# Patient Record
Sex: FEMALE | Race: WHITE | NOT HISPANIC OR LATINO | Employment: OTHER | URBAN - METROPOLITAN AREA
[De-identification: names, ages, dates, MRNs, and addresses within clinical notes are randomized per-mention and may not be internally consistent; named-entity substitution may affect disease eponyms.]

---

## 2025-04-23 RX ORDER — ATENOLOL 50 MG/1
TABLET ORAL
COMMUNITY
Start: 2024-04-09

## 2025-04-23 RX ORDER — ACETAMINOPHEN 500 MG
TABLET ORAL
COMMUNITY

## 2025-04-23 RX ORDER — CHLORTHALIDONE 25 MG/1
25 TABLET ORAL
COMMUNITY
Start: 2025-04-03

## 2025-04-23 RX ORDER — CELECOXIB 200 MG/1
200 CAPSULE ORAL
COMMUNITY
Start: 2024-05-10 | End: 2025-05-07 | Stop reason: HOSPADM

## 2025-04-23 RX ORDER — VIT A ACET/VIT C/ZINC/PROPOLIS
LOZENGE ORAL
COMMUNITY
End: 2025-05-07 | Stop reason: HOSPADM

## 2025-04-23 RX ORDER — FAMOTIDINE 20 MG/1
20 TABLET, FILM COATED ORAL 2 TIMES DAILY
COMMUNITY
Start: 2025-03-24

## 2025-04-23 RX ORDER — ATORVASTATIN CALCIUM 20 MG/1
TABLET, FILM COATED ORAL
COMMUNITY
Start: 2024-04-09

## 2025-04-28 ENCOUNTER — PREP FOR PROCEDURE (OUTPATIENT)
Dept: ORTHOPEDICS | Facility: HOSPITAL | Age: 75
End: 2025-04-28

## 2025-04-28 ENCOUNTER — PRE-ADMISSION TESTING (OUTPATIENT)
Dept: PREADMISSION TESTING | Facility: HOSPITAL | Age: 75
End: 2025-04-28
Payer: MEDICARE

## 2025-04-28 VITALS
RESPIRATION RATE: 20 BRPM | OXYGEN SATURATION: 98 % | WEIGHT: 234.13 LBS | BODY MASS INDEX: 39.97 KG/M2 | HEIGHT: 64 IN | TEMPERATURE: 96.8 F | HEART RATE: 66 BPM

## 2025-04-28 DIAGNOSIS — R79.1 COAGULATION TEST ABNORMALITY: ICD-10-CM

## 2025-04-28 DIAGNOSIS — Z01.818 PREOP TESTING: Primary | ICD-10-CM

## 2025-04-28 LAB
APTT PPP: 32 SECONDS (ref 26–36)
INR PPP: 1.1 (ref 0.9–1.1)
PROTHROMBIN TIME: 12.4 SECONDS (ref 9.8–12.4)

## 2025-04-28 PROCEDURE — 85610 PROTHROMBIN TIME: CPT

## 2025-04-28 PROCEDURE — 99204 OFFICE O/P NEW MOD 45 MIN: CPT

## 2025-04-28 PROCEDURE — 36415 COLL VENOUS BLD VENIPUNCTURE: CPT

## 2025-04-28 PROCEDURE — 87081 CULTURE SCREEN ONLY: CPT | Mod: PARLAB

## 2025-04-28 RX ORDER — SCOPOLAMINE 1 MG/3D
1 PATCH, EXTENDED RELEASE TRANSDERMAL
Status: CANCELLED | OUTPATIENT
Start: 2025-05-06 | End: 2025-05-09

## 2025-04-28 RX ORDER — CHLORHEXIDINE GLUCONATE ORAL RINSE 1.2 MG/ML
15 SOLUTION DENTAL DAILY
Qty: 30 ML | Refills: 0 | Status: SHIPPED | OUTPATIENT
Start: 2025-04-28 | End: 2025-04-30

## 2025-04-28 RX ORDER — CEFAZOLIN SODIUM 2 G/100ML
2 INJECTION, SOLUTION INTRAVENOUS ONCE
Status: CANCELLED | OUTPATIENT
Start: 2025-05-06 | End: 2025-04-28

## 2025-04-28 RX ORDER — CHLORHEXIDINE GLUCONATE 40 MG/ML
SOLUTION TOPICAL DAILY
Qty: 118 ML | Refills: 0 | Status: SHIPPED | OUTPATIENT
Start: 2025-04-28 | End: 2025-05-03

## 2025-04-28 ASSESSMENT — PAIN - FUNCTIONAL ASSESSMENT: PAIN_FUNCTIONAL_ASSESSMENT: 0-10

## 2025-04-28 ASSESSMENT — DUKE ACTIVITY SCORE INDEX (DASI)
CAN YOU RUN A SHORT DISTANCE: YES
CAN YOU CLIMB A FLIGHT OF STAIRS OR WALK UP A HILL: YES
CAN YOU TAKE CARE OF YOURSELF (EAT, DRESS, BATHE, OR USE TOILET): YES
CAN YOU HAVE SEXUAL RELATIONS: NO
CAN YOU PARTICIPATE IN MODERATE RECREATIONAL ACTIVITIES LIKE GOLF, BOWLING, DANCING, DOUBLES TENNIS OR THROWING A BASEBALL OR FOOTBALL: NO
CAN YOU WALK A BLOCK OR TWO ON LEVEL GROUND: NO
CAN YOU DO YARD WORK LIKE RAKING LEAVES, WEEDING OR PUSHING A MOWER: YES
CAN YOU DO LIGHT WORK AROUND THE HOUSE LIKE DUSTING OR WASHING DISHES: YES
CAN YOU PARTICIPATE IN STRENOUS SPORTS LIKE SWIMMING, SINGLES TENNIS, FOOTBALL, BASKETBALL, OR SKIING: NO
CAN YOU WALK INDOORS, SUCH AS AROUND YOUR HOUSE: YES
CAN YOU DO HEAVY WORK AROUND THE HOUSE LIKE SCRUBBING FLOORS OR LIFTING AND MOVING HEAVY FURNITURE: YES

## 2025-04-28 ASSESSMENT — PAIN SCALES - GENERAL: PAINLEVEL_OUTOF10: 0 - NO PAIN

## 2025-04-28 NOTE — CPM/PAT H&P
CPM/PAT Evaluation       Name: Melita Peck (Melita Peck)  /Age: 1950/74 y.o.     Visit Type:   In-Person       Chief Complaint: Left knee pain requiring intervention    HPI  Patient is a 74 year old female with a history of HTN, HLD, GERD and OA who presents today for preoperative evaluation. Patient follows with Dr. Beckman for arthritis of the left knee. She is scheduled for left total knee replacement under spinal anesthesia on 25 with Dr. Beckman. Patient denies recent illness, fever, chills, fatigue, cough, shortness of breath, chest pain, or GI symptoms.   Endorses slight increase in right knee pain with aggravating activities now that she is holding NSAIDs.   Endorses chronic urinary frequency and urgency that is unchanged. Denies burning or pain with urination.   Endorses chronic lower extremity dependent edema. Denies orthopnea.     Medical History[1]    Surgical History[2]    Patient Sexual activity questions deferred to the physician.    Family History[3]    Allergies[4]    Prior to Admission medications    Medication Sig Start Date End Date Taking? Authorizing Provider   ascorbic acid (Vitamin C) 100 mg tablet Take by mouth.    Historical Provider, MD   atenolol (Tenormin) 50 mg tablet  24   Historical Provider, MD   atorvastatin (Lipitor) 20 mg tablet  24   Historical Provider, MD   celecoxib (CeleBREX) 200 mg capsule Take 1 capsule (200 mg) by mouth. 5/10/24   Historical Provider, MD   chlorthalidone (Hygroton) 25 mg tablet Take 1 tablet (25 mg) by mouth once daily. 4/3/25   Historical Provider, MD   cholecalciferol (Vitamin D-3) 50 mcg (2,000 units) capsule Take by mouth.    Historical Provider, MD   famotidine (Pepcid) 20 mg tablet Take 1 tablet (20 mg) by mouth twice a day. 3/24/25   Historical Provider, MD   vit A-vit C-zinc-propolis (Zinc, with A and C, Lozenges) lozenge Take by mouth.    Historical Provider, MD        A ten-point review of systems was completed and is  "otherwise negative except for what is mentioned in the HPI above.    Physical Exam:    GENERAL: Well developed, obese, awake/alert/oriented x3, no distress, alert and cooperative  HEENT: MMM  NECK: Trachea midline, no lymphadenopathy  CARDIOVASCULAR: RRR, normal S1 and S 2, no murmurs, 2+ equal pulses of the extremities  RESPIRATORY: Patent airways, CTAB, normal breath sounds with good chest expansion, thorax symmetric  ABDOMEN: Soft, non-tender, no distention  SKIN: Warm and dry  EXTREMITIES: No cyanosis. Trace dependent edema to bilateral lower legs.   NEURO: A&O x 3, normal motor and sensation, no focal deficits. Chronic numbness in right leg  PSYCH: Appropriate mood and behavior      PAT AIRWAY:   Airway:     Mallampati::  II    TM distance::  >3 FB    Neck ROM::  Full  normal          Visit Vitals  Pulse 66   Temp 36 °C (96.8 °F) (Temporal)   Resp 20   Ht 1.626 m (5' 4\")   Wt 106 kg (234 lb 2.1 oz)   SpO2 98%   BMI 40.19 kg/m²   Smoking Status Former   BSA 2.19 m²       DASI Risk Score      Flowsheet Row Pre-Admission Testing from 4/28/2025 in [facility]   Can you take care of yourself (eat, dress, bathe, or use toilet)?  2.75 filed at 04/28/2025 1416   Can you walk indoors, such as around your house? 1.75 filed at 04/28/2025 1416   Can you walk a block or two on level ground?  0 filed at 04/28/2025 1416   Can you climb a flight of stairs or walk up a hill? 5.5 filed at 04/28/2025 1416   Can you run a short distance? 8 filed at 04/28/2025 1416   Can you do light work around the house like dusting or washing dishes? 2.7 filed at 04/28/2025 1416   Can you do heavy work around the house like scrubbing floors or lifting and moving heavy furniture?  8 filed at 04/28/2025 1416   Can you do yard work like raking leaves, weeding or pushing a mower? 4.5 filed at 04/28/2025 1416   Can you have sexual relations? 0 filed at 04/28/2025 1416   Can you participate in moderate recreational activities like golf, " bowling, dancing, doubles tennis or throwing a baseball or football? 0 filed at 04/28/2025 1416   Can you participate in strenous sports like swimming, singles tennis, football, basketball, or skiing? 0 filed at 04/28/2025 1416          Caprini DVT Assessment    No data to display       Modified Frailty Index    No data to display       [id number] Stroke Risk Points  Current as of 4 minutes ago        N/A 0 to 9 Points:      Last Change: N/A          The [id number] risk score (Lip TED, et al. 2009. © 2010 [employer]) quantifies the risk of stroke for a patient with atrial fibrillation. For patients without atrial fibrillation or under the age of 18 this score appears as N/A. Higher score values generally indicate higher risk of stroke.        This score is not applicable to this patient. Components are not calculated.          Revised Cardiac Risk Index    No data to display       Apfel Simplified Score    No data to display       Risk Analysis Index Results This Encounter    No data found in the last 10 encounters.       Stop Bang Score      Flowsheet Row Pre-Admission Testing from 4/28/2025 in [facility]   Do you snore loudly? 0 filed at 04/28/2025 1418   Do you often feel tired or fatigued after your sleep? 0 filed at 04/28/2025 1418   Has anyone ever observed you stop breathing in your sleep? 0 filed at 04/28/2025 1418   Do you have or are you being treated for high blood pressure? 1 filed at 04/28/2025 1418   Recent BMI (Calculated) 0 filed at 04/28/2025 1418   Age older than 50 years old? 1=Yes filed at 04/28/2025 1418   Gender - Male 0=No filed at 04/28/2025 1418          Prodigy: High Risk  Total Score: 12              Prodigy Age Score           ARISCAT Score for Postoperative Pulmonary Complications      Flowsheet Row Pre-Admission Testing from 4/28/2025 in [facility]   Age Calculated Score 3 filed at 04/28/2025 1506   Preoperative SpO2 0 filed at  04/28/2025 1506   Respiratory infection in the last month Either upper or lower (i.e., URI, bronchitis, pneumonia), with fever and antibiotic treatment 0 filed at 04/28/2025 1506   Preoperative anemia (Hgb less than 10 g/dl) 0 filed at 04/28/2025 1506   Surgical incision  0 filed at 04/28/2025 1506   Duration of surgery  16 filed at 04/28/2025 1506   Emergency Procedure  0 filed at 04/28/2025 1506   ARISCAT Total Score  19 filed at 04/28/2025 1506          Catherine Perioperative Risk for Myocardial Infarction or Cardiac Arrest (SILKE)      Flowsheet Row Pre-Admission Testing from 4/28/2025 in [facility]   Calculated Age Score 1.52 filed at 04/28/2025 1506   Functional Status  0 filed at 04/28/2025 1506   ASA Class  -3.29 filed at 04/28/2025 1506   Creatinine 0 filed at 04/28/2025 1506   Type of Procedure  0.80 filed at 04/28/2025 1506   SILKE Total Score  -6.22 filed at 04/28/2025 1506   SILKE % 0.2 filed at 04/28/2025 1506            Assessment and Plan:   Patient is a 74 year old female with a history of HTN, HLD, GERD and OA.    Follows with PCP Mónica Erazo CNP - last seen 4/4/25    #Arthritis of the left knee  She is scheduled for left total knee replacement under spinal anesthesia on 5/6/25 with Dr. Beckman.   Preliminarily medically cleared by PCP at visit on 4/4/25 pending diagnostics - formal clearance to be communicated to surgeon    #HTN, Hx  #HLD, Hx  Currently maintained on atenolol, chlorthalidone and atorvastatin  Lipid panel from 4/10/25 shows good control  BP is a little elevated on exam today in the 150s/80s but during routine office visits range is in the 130s/80s    #GERD, Hx  Only while taking NSAIDs or with spicy food - symptoms controled with Pepcid as needed    Labs from 4/10/25 reviewed (cbc, cmp, A1c, lipid panel, magnesium, tsh)    A1c 5.8    CMP and Mg grossly unremarkable    TSH and CBC are normal  Coag and MRSA obtained today and pending  EKG from 2/28/25 demonstrates normal  sinus rhythm with minimal voltage criteria for LVH    I spent 45 minutes in the professional and overall care of this patient. Greater than 50% of this time was spent counseling patient, reviewing plan of care and discussing medication perioperative management.    ZULY Roberson-MARY          [1]   Past Medical History:  Diagnosis Date    Arthritis     HLD (hyperlipidemia)     Hypertension     Joint pain     PONV (postoperative nausea and vomiting)     Vision loss    [2]   Past Surgical History:  Procedure Laterality Date    BLADDER SUSPENSION      COLONOSCOPY      HYSTERECTOMY      TONSILLECTOMY     [3]   Family History  Problem Relation Name Age of Onset    Other (CABG) Father     [4] No Known Allergies

## 2025-04-28 NOTE — PREPROCEDURE INSTRUCTIONS
Medication List            Accurate as of April 28, 2025  2:59 PM. Always use your most recent med list.                ascorbic acid 100 mg tablet  Commonly known as: Vitamin C  Additional Medication Adjustments for Surgery: Take last dose 7 days before surgery     atenolol 50 mg tablet  Commonly known as: Tenormin  Medication Adjustments for Surgery: Take/Use as prescribed     atorvastatin 20 mg tablet  Commonly known as: Lipitor  Medication Adjustments for Surgery: Take/Use as prescribed     celecoxib 200 mg capsule  Commonly known as: CeleBREX  Additional Medication Adjustments for Surgery: Take last dose 7 days before surgery  Notes to patient: Avoid NSAIDs within 7 days of surgery.  Tylenol is OK for pain management leading up to surgery     * chlorhexidine 4 % external liquid  Commonly known as: Hibiclens  Apply topically early in the morning. for 5 days. Use wash as directed daily for 5 days prior to surgery with the 5th day being the morning of surgery.  Medication Adjustments for Surgery: Take/Use as prescribed     * chlorhexidine 0.12 % solution  Commonly known as: Peridex  Use 15 mL in the mouth or throat early in the morning. for 2 days. Rinse mouth by swishing medication and spitting. Use daily for 2 days prior to surgery with the 2nd day being the morning of surgery.  Medication Adjustments for Surgery: Take/Use as prescribed     chlorthalidone 25 mg tablet  Commonly known as: Hygroton  Medication Adjustments for Surgery: Take/Use as prescribed     cholecalciferol 50 mcg (2,000 units) capsule  Commonly known as: Vitamin D-3  Additional Medication Adjustments for Surgery: Take last dose 7 days before surgery     famotidine 20 mg tablet  Commonly known as: Pepcid  Medication Adjustments for Surgery: Take/Use as prescribed     Zinc (with A and C) Lozenges lozenge  Generic drug: vit A-vit C-zinc-propolis  Additional Medication Adjustments for Surgery: Take last dose 7 days before surgery           *  This list has 2 medication(s) that are the same as other medications prescribed for you. Read the directions carefully, and ask your doctor or other care provider to review them with you.                                  NPO Instructions:    Do not eat any food after midnight the night before your surgery/procedure.  You may have clear liquids until TWO hours before surgery/procedure. This includes water, black tea/coffee, (no milk or cream) apple juice and electrolyte drinks (Gatorade).  You may chew gum up to TWO hours before your surgery/procedure.    Additional Instructions:     Review your medication instructions, take indicated medications  You may have clear liquids until TWO hours before surgery/procedure.  This includes water, black tea/coffee, (no milk or cream) apple juice and electrolyte drinks (Gatorade)  You may chew gum up to TWO hours before your surgery/procedure  Wear  comfortable loose fitting clothing  Do not use moisturizers, creams, lotions or perfume  All jewelry and valuables should be left at home      PRE-OPERATIVE INSTRUCTIONS FOR SURGERY    *Do not eat anything after midnight the night of surgery.  This includes food of any kind (including hard candy, cough drops, mints).   You may have up to 13 ounces of clear liquid  until TWO hours prior to your scheduled arrival time.  Clear liquids include water, black tea/coffee, (no milk or cream) apple juice and electrolyte drinks (GATORADE).  You may chew gum until TWO hours prior you your surgery/procedure.         *One of our staff members will call you ONE business day before your surgery, between 11 am-2 pm to let you know the time to arrive.  If you have not received a call by 2 pm, call [phone]    *When you arrive at the hospital-->GO TO Registration on the ground floor  *Stop smoking 24 hours prior to surgery.  No Marijuana, CBD Oil or Vaping for 48 hours  *No alcohol 24 hours prior to surgery  *You will need a responsible adult to  drive you home  -No acrylic nails or nail polish on at least one fingernail, NO polish on toes for foot surgery  -You may be asked to remove your dentures, partial plate, eyeglasses or contact lenses before going to surgery.  Please bring a case for these items.  -Body piercings need to be removed.  Jewelry and valuables should be left at home.  -Put on loose,  comfortable, clean clothing, that will accommodate bandages        What is a home antibacterial shower?  This shower is a way of cleaning the skin with a germ killing solution before surgery.  The solution contains chlorhexidine, commonly known as CHG.  CHG is a skin cleanser with germ killing ability.  Let your doctor know if you are allergic to chlorhexidine.    Why do I need to take a preoperative antibacterial shower?  Skin is not sterile.  It is best to try to make your skin as free of germs as possible before surgery.  Proper cleansing with a germ killing soap before surgery can lower the number of germs on your skin.  This helps to reduce the risk of infection at the surgical site.  Following the instructions listed below will help you prepare your skin for surgery.      How do I use the solution?    Steps: Begin using your CHG soap 5 days before your surgery on __5/1/25________________.    *First, wash and rinse your hair using the CHG soap.  Keep CHG soap away from ear canals and eyes.   Rinse completely, do not condition.  Hair extensions should be removed.    *Wash your face with your normal soap and rinse.   *Apply the CHG solution to a clean wet washcloth.  Turn the water off or move away from the water spray to avoid premature rinsing of the CHG soap as you are applying.  Firmly lather your entire body from the neck down.  Do not use on your face.    *Pay special attention to the area(s) where your incision(s) will be located unless they are on your face.  Avoid scrubbing your skin too hard.  The important part is to have the CHG soap sit on  your skin for 3 minutes.   *When the 3 minutes are up, turn on the water and rinse the CHG solution off your body completely.  *Do not wash with regular soap after you  have  used the CHG soap solution.  *Pat  yourself dry with a clean, freshly laundered towel.  *Do not apply powders, deodorants or lotions.  *Dress in clean freshly laundered night clothes.    *Be sure to sleep with clean freshly laundered sheets.    *Be aware the CHG will cause stains on fabrics; if you wash them with bleach after use.  Rinse your washcloth and other linens that have contact with CHG completely.  Use only non-chlorine detergents to launder the items  used.  *The morning of surgery is the fifth day.  Repeat the above steps and dress in clean comfortable clothing.     What is oral/dental rinse?  It is mouthwash.  It is a way of cleaning the he mouth with a germ-killing solution before your surgery.  The solution contains chlorhexidine, commonly known as CHG.  It is used inside the mouth to kill a bacteria known as Staphylococcus aureus.  Let your doctor know if you are allergic to Chlorhexidine.    Why do I need to use CHG oral/ dental rinse?  The CHG oral/dental rinse helps to kill bacteria in your mouth know as Staphylococcus aureus.  This reduces the risk of infection at the surgical site.    Using your CHG oral/dental rinse    STEPS:5/5-5/6    Use your CHG oral/dental rinse after you brush your teeth the night before (at bedtime) and the morning of your surgery.  Follow all the directions on your prescription label.  *Use the cap on the container to measure 15 ml  *Swish (gargle if you can) the mouthwash in your mouth for at least 30 seconds, (do not swallow) and spit out  *After you use your CHG rinse, do not rinse your mouth with water, drink or eat.  Please refer to the prescription label for the appropriate time to resume oral intake.    What side effects might I have using the CHG oral/dental rinse?  CHG rinse will stick you  plaque on the teeth.  Brush and floss just before use.   Teeth brushing will help avoid staining of the plaque during  use.  Teeth brushing will help avoid staining of plaque during  use.      *If you have any further questions about your pre-op instructions,  not mentioned in this handout, then call [phone]*    What you may be asked to bring to surgery:  ___Crutches, walker  ___CPAP machine  ___Urine specimen

## 2025-04-28 NOTE — H&P (VIEW-ONLY)
CPM/PAT Evaluation       Name: Melita Peck (Melita Peck)  /Age: 1950/74 y.o.     Visit Type:   In-Person       Chief Complaint: Left knee pain requiring intervention    HPI  Patient is a 74 year old female with a history of HTN, HLD, GERD and OA who presents today for preoperative evaluation. Patient follows with Dr. Beckman for arthritis of the left knee. She is scheduled for left total knee replacement under spinal anesthesia on 25 with Dr. Beckman. Patient denies recent illness, fever, chills, fatigue, cough, shortness of breath, chest pain, or GI symptoms.   Endorses slight increase in right knee pain with aggravating activities now that she is holding NSAIDs.   Endorses chronic urinary frequency and urgency that is unchanged. Denies burning or pain with urination.   Endorses chronic lower extremity dependent edema. Denies orthopnea.     Medical History[1]    Surgical History[2]    Patient Sexual activity questions deferred to the physician.    Family History[3]    Allergies[4]    Prior to Admission medications    Medication Sig Start Date End Date Taking? Authorizing Provider   ascorbic acid (Vitamin C) 100 mg tablet Take by mouth.    Historical Provider, MD   atenolol (Tenormin) 50 mg tablet  24   Historical Provider, MD   atorvastatin (Lipitor) 20 mg tablet  24   Historical Provider, MD   celecoxib (CeleBREX) 200 mg capsule Take 1 capsule (200 mg) by mouth. 5/10/24   Historical Provider, MD   chlorthalidone (Hygroton) 25 mg tablet Take 1 tablet (25 mg) by mouth once daily. 4/3/25   Historical Provider, MD   cholecalciferol (Vitamin D-3) 50 mcg (2,000 units) capsule Take by mouth.    Historical Provider, MD   famotidine (Pepcid) 20 mg tablet Take 1 tablet (20 mg) by mouth twice a day. 3/24/25   Historical Provider, MD   vit A-vit C-zinc-propolis (Zinc, with A and C, Lozenges) lozenge Take by mouth.    Historical Provider, MD        A ten-point review of systems was completed and is  "otherwise negative except for what is mentioned in the HPI above.    Physical Exam:    GENERAL: Well developed, obese, awake/alert/oriented x3, no distress, alert and cooperative  HEENT: MMM  NECK: Trachea midline, no lymphadenopathy  CARDIOVASCULAR: RRR, normal S1 and S 2, no murmurs, 2+ equal pulses of the extremities  RESPIRATORY: Patent airways, CTAB, normal breath sounds with good chest expansion, thorax symmetric  ABDOMEN: Soft, non-tender, no distention  SKIN: Warm and dry  EXTREMITIES: No cyanosis. Trace dependent edema to bilateral lower legs.   NEURO: A&O x 3, normal motor and sensation, no focal deficits. Chronic numbness in right leg  PSYCH: Appropriate mood and behavior      PAT AIRWAY:   Airway:     Mallampati::  II    TM distance::  >3 FB    Neck ROM::  Full  normal          Visit Vitals  Pulse 66   Temp 36 °C (96.8 °F) (Temporal)   Resp 20   Ht 1.626 m (5' 4\")   Wt 106 kg (234 lb 2.1 oz)   SpO2 98%   BMI 40.19 kg/m²   Smoking Status Former   BSA 2.19 m²       DASI Risk Score      Flowsheet Row Pre-Admission Testing from 4/28/2025 in [facility]   Can you take care of yourself (eat, dress, bathe, or use toilet)?  2.75 filed at 04/28/2025 1416   Can you walk indoors, such as around your house? 1.75 filed at 04/28/2025 1416   Can you walk a block or two on level ground?  0 filed at 04/28/2025 1416   Can you climb a flight of stairs or walk up a hill? 5.5 filed at 04/28/2025 1416   Can you run a short distance? 8 filed at 04/28/2025 1416   Can you do light work around the house like dusting or washing dishes? 2.7 filed at 04/28/2025 1416   Can you do heavy work around the house like scrubbing floors or lifting and moving heavy furniture?  8 filed at 04/28/2025 1416   Can you do yard work like raking leaves, weeding or pushing a mower? 4.5 filed at 04/28/2025 1416   Can you have sexual relations? 0 filed at 04/28/2025 1416   Can you participate in moderate recreational activities like golf, " bowling, dancing, doubles tennis or throwing a baseball or football? 0 filed at 04/28/2025 1416   Can you participate in strenous sports like swimming, singles tennis, football, basketball, or skiing? 0 filed at 04/28/2025 1416          Caprini DVT Assessment    No data to display       Modified Frailty Index    No data to display       [id number] Stroke Risk Points  Current as of 4 minutes ago        N/A 0 to 9 Points:      Last Change: N/A          The [id number] risk score (Lip TED, et al. 2009. © 2010 [employer]) quantifies the risk of stroke for a patient with atrial fibrillation. For patients without atrial fibrillation or under the age of 18 this score appears as N/A. Higher score values generally indicate higher risk of stroke.        This score is not applicable to this patient. Components are not calculated.          Revised Cardiac Risk Index    No data to display       Apfel Simplified Score    No data to display       Risk Analysis Index Results This Encounter    No data found in the last 10 encounters.       Stop Bang Score      Flowsheet Row Pre-Admission Testing from 4/28/2025 in [facility]   Do you snore loudly? 0 filed at 04/28/2025 1418   Do you often feel tired or fatigued after your sleep? 0 filed at 04/28/2025 1418   Has anyone ever observed you stop breathing in your sleep? 0 filed at 04/28/2025 1418   Do you have or are you being treated for high blood pressure? 1 filed at 04/28/2025 1418   Recent BMI (Calculated) 0 filed at 04/28/2025 1418   Age older than 50 years old? 1=Yes filed at 04/28/2025 1418   Gender - Male 0=No filed at 04/28/2025 1418          Prodigy: High Risk  Total Score: 12              Prodigy Age Score           ARISCAT Score for Postoperative Pulmonary Complications      Flowsheet Row Pre-Admission Testing from 4/28/2025 in [facility]   Age Calculated Score 3 filed at 04/28/2025 1506   Preoperative SpO2 0 filed at  04/28/2025 1506   Respiratory infection in the last month Either upper or lower (i.e., URI, bronchitis, pneumonia), with fever and antibiotic treatment 0 filed at 04/28/2025 1506   Preoperative anemia (Hgb less than 10 g/dl) 0 filed at 04/28/2025 1506   Surgical incision  0 filed at 04/28/2025 1506   Duration of surgery  16 filed at 04/28/2025 1506   Emergency Procedure  0 filed at 04/28/2025 1506   ARISCAT Total Score  19 filed at 04/28/2025 1506          Catherine Perioperative Risk for Myocardial Infarction or Cardiac Arrest (SILKE)      Flowsheet Row Pre-Admission Testing from 4/28/2025 in [facility]   Calculated Age Score 1.52 filed at 04/28/2025 1506   Functional Status  0 filed at 04/28/2025 1506   ASA Class  -3.29 filed at 04/28/2025 1506   Creatinine 0 filed at 04/28/2025 1506   Type of Procedure  0.80 filed at 04/28/2025 1506   SILKE Total Score  -6.22 filed at 04/28/2025 1506   SILKE % 0.2 filed at 04/28/2025 1506            Assessment and Plan:   Patient is a 74 year old female with a history of HTN, HLD, GERD and OA.    Follows with PCP Mónica Erazo CNP - last seen 4/4/25    #Arthritis of the left knee  She is scheduled for left total knee replacement under spinal anesthesia on 5/6/25 with Dr. Beckman.   Preliminarily medically cleared by PCP at visit on 4/4/25 pending diagnostics - formal clearance to be communicated to surgeon    #HTN, Hx  #HLD, Hx  Currently maintained on atenolol, chlorthalidone and atorvastatin  Lipid panel from 4/10/25 shows good control  BP is a little elevated on exam today in the 150s/80s but during routine office visits range is in the 130s/80s    #GERD, Hx  Only while taking NSAIDs or with spicy food - symptoms controled with Pepcid as needed    Labs from 4/10/25 reviewed (cbc, cmp, A1c, lipid panel, magnesium, tsh)    A1c 5.8    CMP and Mg grossly unremarkable    TSH and CBC are normal  Coag and MRSA obtained today and pending  EKG from 2/28/25 demonstrates normal  sinus rhythm with minimal voltage criteria for LVH    I spent 45 minutes in the professional and overall care of this patient. Greater than 50% of this time was spent counseling patient, reviewing plan of care and discussing medication perioperative management.    ZULY Roberson-MARY          [1]   Past Medical History:  Diagnosis Date    Arthritis     HLD (hyperlipidemia)     Hypertension     Joint pain     PONV (postoperative nausea and vomiting)     Vision loss    [2]   Past Surgical History:  Procedure Laterality Date    BLADDER SUSPENSION      COLONOSCOPY      HYSTERECTOMY      TONSILLECTOMY     [3]   Family History  Problem Relation Name Age of Onset    Other (CABG) Father     [4] No Known Allergies

## 2025-04-30 LAB — STAPHYLOCOCCUS SPEC CULT: NORMAL

## 2025-05-06 ENCOUNTER — HOSPITAL ENCOUNTER (OUTPATIENT)
Facility: HOSPITAL | Age: 75
Discharge: HOME HEALTH | End: 2025-05-07
Attending: ORTHOPAEDIC SURGERY | Admitting: ORTHOPAEDIC SURGERY
Payer: MEDICARE

## 2025-05-06 ENCOUNTER — ANESTHESIA (OUTPATIENT)
Dept: OPERATING ROOM | Facility: HOSPITAL | Age: 75
End: 2025-05-06
Payer: MEDICARE

## 2025-05-06 ENCOUNTER — ANESTHESIA EVENT (OUTPATIENT)
Dept: OPERATING ROOM | Facility: HOSPITAL | Age: 75
End: 2025-05-06
Payer: MEDICARE

## 2025-05-06 DIAGNOSIS — M17.12 ARTHRITIS OF LEFT KNEE: ICD-10-CM

## 2025-05-06 PROBLEM — R11.2 PONV (POSTOPERATIVE NAUSEA AND VOMITING): Status: ACTIVE | Noted: 2025-05-06

## 2025-05-06 PROBLEM — Z98.890 PONV (POSTOPERATIVE NAUSEA AND VOMITING): Status: ACTIVE | Noted: 2025-05-06

## 2025-05-06 PROCEDURE — 7100000002 HC RECOVERY ROOM TIME - EACH INCREMENTAL 1 MINUTE: Performed by: ORTHOPAEDIC SURGERY

## 2025-05-06 PROCEDURE — 3700000002 HC GENERAL ANESTHESIA TIME - EACH INCREMENTAL 1 MINUTE: Performed by: ORTHOPAEDIC SURGERY

## 2025-05-06 PROCEDURE — 3700000001 HC GENERAL ANESTHESIA TIME - INITIAL BASE CHARGE: Performed by: ORTHOPAEDIC SURGERY

## 2025-05-06 PROCEDURE — 2720000007 HC OR 272 NO HCPCS: Performed by: ORTHOPAEDIC SURGERY

## 2025-05-06 PROCEDURE — C1713 ANCHOR/SCREW BN/BN,TIS/BN: HCPCS | Performed by: ORTHOPAEDIC SURGERY

## 2025-05-06 PROCEDURE — 7100000011 HC EXTENDED STAY RECOVERY HOURLY - NURSING UNIT

## 2025-05-06 PROCEDURE — 99100 ANES PT EXTEME AGE<1 YR&>70: CPT | Performed by: ANESTHESIOLOGY

## 2025-05-06 PROCEDURE — 64447 NJX AA&/STRD FEMORAL NRV IMG: CPT | Performed by: ANESTHESIOLOGY

## 2025-05-06 PROCEDURE — 88311 DECALCIFY TISSUE: CPT | Performed by: PATHOLOGY

## 2025-05-06 PROCEDURE — 2500000004 HC RX 250 GENERAL PHARMACY W/ HCPCS (ALT 636 FOR OP/ED): Mod: JZ | Performed by: NURSE ANESTHETIST, CERTIFIED REGISTERED

## 2025-05-06 PROCEDURE — 88305 TISSUE EXAM BY PATHOLOGIST: CPT | Performed by: PATHOLOGY

## 2025-05-06 PROCEDURE — 3600000018 HC OR TIME - INITIAL BASE CHARGE - PROCEDURE LEVEL SIX: Performed by: ORTHOPAEDIC SURGERY

## 2025-05-06 PROCEDURE — 2500000004 HC RX 250 GENERAL PHARMACY W/ HCPCS (ALT 636 FOR OP/ED): Performed by: CLINICAL NURSE SPECIALIST

## 2025-05-06 PROCEDURE — 2500000005 HC RX 250 GENERAL PHARMACY W/O HCPCS: Performed by: ORTHOPAEDIC SURGERY

## 2025-05-06 PROCEDURE — 2500000004 HC RX 250 GENERAL PHARMACY W/ HCPCS (ALT 636 FOR OP/ED): Mod: JZ | Performed by: ORTHOPAEDIC SURGERY

## 2025-05-06 PROCEDURE — A27447 PR TOTAL KNEE ARTHROPLASTY: Performed by: ANESTHESIOLOGY

## 2025-05-06 PROCEDURE — 88305 TISSUE EXAM BY PATHOLOGIST: CPT | Mod: TC,PARLAB | Performed by: ORTHOPAEDIC SURGERY

## 2025-05-06 PROCEDURE — 2500000004 HC RX 250 GENERAL PHARMACY W/ HCPCS (ALT 636 FOR OP/ED): Mod: JZ | Performed by: ANESTHESIOLOGY

## 2025-05-06 PROCEDURE — C1776 JOINT DEVICE (IMPLANTABLE): HCPCS | Performed by: ORTHOPAEDIC SURGERY

## 2025-05-06 PROCEDURE — 2500000001 HC RX 250 WO HCPCS SELF ADMINISTERED DRUGS (ALT 637 FOR MEDICARE OP): Performed by: CLINICAL NURSE SPECIALIST

## 2025-05-06 PROCEDURE — A27447 PR TOTAL KNEE ARTHROPLASTY: Performed by: NURSE ANESTHETIST, CERTIFIED REGISTERED

## 2025-05-06 PROCEDURE — 2780000003 HC OR 278 NO HCPCS: Performed by: ORTHOPAEDIC SURGERY

## 2025-05-06 PROCEDURE — 7100000001 HC RECOVERY ROOM TIME - INITIAL BASE CHARGE: Performed by: ORTHOPAEDIC SURGERY

## 2025-05-06 PROCEDURE — 2500000001 HC RX 250 WO HCPCS SELF ADMINISTERED DRUGS (ALT 637 FOR MEDICARE OP): Performed by: ANESTHESIOLOGY

## 2025-05-06 PROCEDURE — 2500000001 HC RX 250 WO HCPCS SELF ADMINISTERED DRUGS (ALT 637 FOR MEDICARE OP): Performed by: ORTHOPAEDIC SURGERY

## 2025-05-06 PROCEDURE — 3600000017 HC OR TIME - EACH INCREMENTAL 1 MINUTE - PROCEDURE LEVEL SIX: Performed by: ORTHOPAEDIC SURGERY

## 2025-05-06 DEVICE — SIGMA FEMORAL POSTERIOR STABILIZED CEMENTED SIZE 4N LEFT
Type: IMPLANTABLE DEVICE | Site: KNEE | Status: FUNCTIONAL
Brand: SIGMA

## 2025-05-06 DEVICE — P.F.C. TIBIAL TRAY FIXED BEARING MODULAR PLUS 3 CEMENTED
Type: IMPLANTABLE DEVICE | Site: KNEE | Status: FUNCTIONAL
Brand: P.F.C.

## 2025-05-06 DEVICE — P.F.C. SIGMA OVAL DOME PATELLA 3-PEG 38MM CEMENTED
Type: IMPLANTABLE DEVICE | Site: KNEE | Status: FUNCTIONAL
Brand: P.F.C. SIGMA

## 2025-05-06 DEVICE — P.F.C. SIGMA TIBIAL INSERT FIXED BEARING STABILIZED 3 8MM XLK
Type: IMPLANTABLE DEVICE | Site: KNEE | Status: FUNCTIONAL
Brand: P.F.C. SIGMA

## 2025-05-06 RX ORDER — MIDAZOLAM HYDROCHLORIDE 1 MG/ML
INJECTION, SOLUTION INTRAMUSCULAR; INTRAVENOUS
Status: COMPLETED
Start: 2025-05-06 | End: 2025-05-06

## 2025-05-06 RX ORDER — CEFAZOLIN SODIUM 2 G/100ML
2 INJECTION, SOLUTION INTRAVENOUS ONCE
Status: COMPLETED | OUTPATIENT
Start: 2025-05-06 | End: 2025-05-06

## 2025-05-06 RX ORDER — SCOPOLAMINE 1 MG/3D
1 PATCH, EXTENDED RELEASE TRANSDERMAL
Status: DISCONTINUED | OUTPATIENT
Start: 2025-05-06 | End: 2025-05-06

## 2025-05-06 RX ORDER — ACETAMINOPHEN 325 MG/1
650 TABLET ORAL EVERY 6 HOURS SCHEDULED
Status: DISCONTINUED | OUTPATIENT
Start: 2025-05-06 | End: 2025-05-07

## 2025-05-06 RX ORDER — PROPOFOL 10 MG/ML
INJECTION, EMULSION INTRAVENOUS CONTINUOUS PRN
Status: DISCONTINUED | OUTPATIENT
Start: 2025-05-06 | End: 2025-05-06

## 2025-05-06 RX ORDER — ATORVASTATIN CALCIUM 20 MG/1
20 TABLET, FILM COATED ORAL NIGHTLY
Status: DISCONTINUED | OUTPATIENT
Start: 2025-05-07 | End: 2025-05-07 | Stop reason: HOSPADM

## 2025-05-06 RX ORDER — ALBUTEROL SULFATE 0.83 MG/ML
2.5 SOLUTION RESPIRATORY (INHALATION) ONCE AS NEEDED
Status: DISCONTINUED | OUTPATIENT
Start: 2025-05-06 | End: 2025-05-06 | Stop reason: HOSPADM

## 2025-05-06 RX ORDER — POLYETHYLENE GLYCOL 3350 17 G/17G
17 POWDER, FOR SOLUTION ORAL DAILY
Status: DISCONTINUED | OUTPATIENT
Start: 2025-05-06 | End: 2025-05-07 | Stop reason: HOSPADM

## 2025-05-06 RX ORDER — TRANEXAMIC ACID 10 MG/ML
1000 INJECTION, SOLUTION INTRAVENOUS ONCE
Status: COMPLETED | OUTPATIENT
Start: 2025-05-06 | End: 2025-05-06

## 2025-05-06 RX ORDER — KETOROLAC TROMETHAMINE 30 MG/ML
15 INJECTION, SOLUTION INTRAMUSCULAR; INTRAVENOUS EVERY 8 HOURS
Status: COMPLETED | OUTPATIENT
Start: 2025-05-06 | End: 2025-05-07

## 2025-05-06 RX ORDER — ONDANSETRON HYDROCHLORIDE 2 MG/ML
4 INJECTION, SOLUTION INTRAVENOUS ONCE AS NEEDED
Status: DISCONTINUED | OUTPATIENT
Start: 2025-05-06 | End: 2025-05-06 | Stop reason: HOSPADM

## 2025-05-06 RX ORDER — OXYCODONE HYDROCHLORIDE 5 MG/1
5 TABLET ORAL EVERY 6 HOURS PRN
Status: DISCONTINUED | OUTPATIENT
Start: 2025-05-06 | End: 2025-05-07 | Stop reason: HOSPADM

## 2025-05-06 RX ORDER — OXYCODONE HYDROCHLORIDE 5 MG/1
10 TABLET ORAL EVERY 4 HOURS PRN
Status: DISCONTINUED | OUTPATIENT
Start: 2025-05-06 | End: 2025-05-07 | Stop reason: HOSPADM

## 2025-05-06 RX ORDER — MIDAZOLAM HYDROCHLORIDE 1 MG/ML
INJECTION INTRAMUSCULAR; INTRAVENOUS
Status: COMPLETED | OUTPATIENT
Start: 2025-05-06 | End: 2025-05-06

## 2025-05-06 RX ORDER — SODIUM CHLORIDE, SODIUM LACTATE, POTASSIUM CHLORIDE, CALCIUM CHLORIDE 600; 310; 30; 20 MG/100ML; MG/100ML; MG/100ML; MG/100ML
75 INJECTION, SOLUTION INTRAVENOUS CONTINUOUS
Status: DISCONTINUED | OUTPATIENT
Start: 2025-05-06 | End: 2025-05-07 | Stop reason: HOSPADM

## 2025-05-06 RX ORDER — GABAPENTIN 300 MG/1
300 CAPSULE ORAL ONCE
Status: COMPLETED | OUTPATIENT
Start: 2025-05-06 | End: 2025-05-06

## 2025-05-06 RX ORDER — CHLORTHALIDONE 25 MG/1
25 TABLET ORAL
Status: DISCONTINUED | OUTPATIENT
Start: 2025-05-07 | End: 2025-05-07 | Stop reason: HOSPADM

## 2025-05-06 RX ORDER — DOCUSATE SODIUM 100 MG/1
100 CAPSULE, LIQUID FILLED ORAL 2 TIMES DAILY
Status: DISCONTINUED | OUTPATIENT
Start: 2025-05-06 | End: 2025-05-07 | Stop reason: HOSPADM

## 2025-05-06 RX ORDER — ONDANSETRON HYDROCHLORIDE 2 MG/ML
4 INJECTION, SOLUTION INTRAVENOUS EVERY 8 HOURS PRN
Status: DISCONTINUED | OUTPATIENT
Start: 2025-05-06 | End: 2025-05-07 | Stop reason: HOSPADM

## 2025-05-06 RX ORDER — ACETAMINOPHEN 325 MG/1
650 TABLET ORAL EVERY 4 HOURS PRN
Status: DISCONTINUED | OUTPATIENT
Start: 2025-05-06 | End: 2025-05-06 | Stop reason: HOSPADM

## 2025-05-06 RX ORDER — ATENOLOL 50 MG/1
50 TABLET ORAL DAILY
Status: DISCONTINUED | OUTPATIENT
Start: 2025-05-07 | End: 2025-05-07 | Stop reason: HOSPADM

## 2025-05-06 RX ORDER — SODIUM CHLORIDE 0.9 G/100ML
INJECTION, SOLUTION IRRIGATION AS NEEDED
Status: DISCONTINUED | OUTPATIENT
Start: 2025-05-06 | End: 2025-05-06 | Stop reason: HOSPADM

## 2025-05-06 RX ORDER — CELECOXIB 100 MG/1
100 CAPSULE ORAL ONCE
Status: COMPLETED | OUTPATIENT
Start: 2025-05-06 | End: 2025-05-06

## 2025-05-06 RX ORDER — TRAMADOL HYDROCHLORIDE 50 MG/1
50 TABLET, FILM COATED ORAL EVERY 6 HOURS PRN
Status: DISCONTINUED | OUTPATIENT
Start: 2025-05-06 | End: 2025-05-07 | Stop reason: HOSPADM

## 2025-05-06 RX ORDER — TRANEXAMIC ACID 10 MG/ML
INJECTION, SOLUTION INTRAVENOUS
Status: COMPLETED
Start: 2025-05-06 | End: 2025-05-06

## 2025-05-06 RX ORDER — MEPERIDINE HYDROCHLORIDE 50 MG/ML
12.5 INJECTION INTRAMUSCULAR; INTRAVENOUS; SUBCUTANEOUS EVERY 10 MIN PRN
Status: DISCONTINUED | OUTPATIENT
Start: 2025-05-06 | End: 2025-05-06 | Stop reason: HOSPADM

## 2025-05-06 RX ORDER — ACETAMINOPHEN 325 MG/1
650 TABLET ORAL ONCE
Status: COMPLETED | OUTPATIENT
Start: 2025-05-06 | End: 2025-05-06

## 2025-05-06 RX ORDER — NALOXONE HYDROCHLORIDE 1 MG/ML
0.2 INJECTION INTRAMUSCULAR; INTRAVENOUS; SUBCUTANEOUS EVERY 5 MIN PRN
Status: DISCONTINUED | OUTPATIENT
Start: 2025-05-06 | End: 2025-05-07 | Stop reason: HOSPADM

## 2025-05-06 RX ORDER — LIDOCAINE HYDROCHLORIDE 20 MG/ML
INJECTION, SOLUTION EPIDURAL; INFILTRATION; INTRACAUDAL; PERINEURAL AS NEEDED
Status: DISCONTINUED | OUTPATIENT
Start: 2025-05-06 | End: 2025-05-06

## 2025-05-06 RX ORDER — ONDANSETRON HYDROCHLORIDE 2 MG/ML
INJECTION, SOLUTION INTRAVENOUS AS NEEDED
Status: DISCONTINUED | OUTPATIENT
Start: 2025-05-06 | End: 2025-05-06

## 2025-05-06 RX ORDER — DEXAMETHASONE SODIUM PHOSPHATE 10 MG/ML
6 INJECTION INTRAMUSCULAR; INTRAVENOUS ONCE
Status: DISCONTINUED | OUTPATIENT
Start: 2025-05-06 | End: 2025-05-06 | Stop reason: HOSPADM

## 2025-05-06 RX ORDER — ONDANSETRON 4 MG/1
4 TABLET, FILM COATED ORAL EVERY 8 HOURS PRN
Status: DISCONTINUED | OUTPATIENT
Start: 2025-05-06 | End: 2025-05-07 | Stop reason: HOSPADM

## 2025-05-06 RX ORDER — TRAMADOL HYDROCHLORIDE 50 MG/1
50 TABLET, FILM COATED ORAL ONCE
Status: COMPLETED | OUTPATIENT
Start: 2025-05-06 | End: 2025-05-06

## 2025-05-06 RX ORDER — CEFAZOLIN SODIUM 2 G/100ML
2 INJECTION, SOLUTION INTRAVENOUS EVERY 8 HOURS
Status: COMPLETED | OUTPATIENT
Start: 2025-05-06 | End: 2025-05-07

## 2025-05-06 RX ORDER — ROPIVACAINE/EPI/CLONIDINE/KET 2.46-0.005
SYRINGE (ML) INJECTION AS NEEDED
Status: DISCONTINUED | OUTPATIENT
Start: 2025-05-06 | End: 2025-05-06 | Stop reason: HOSPADM

## 2025-05-06 RX ORDER — MIDAZOLAM HYDROCHLORIDE 1 MG/ML
INJECTION, SOLUTION INTRAMUSCULAR; INTRAVENOUS AS NEEDED
Status: DISCONTINUED | OUTPATIENT
Start: 2025-05-06 | End: 2025-05-06

## 2025-05-06 RX ORDER — FAMOTIDINE 20 MG/1
20 TABLET, FILM COATED ORAL DAILY
Status: DISCONTINUED | OUTPATIENT
Start: 2025-05-06 | End: 2025-05-07 | Stop reason: HOSPADM

## 2025-05-06 RX ADMIN — SCOLOPAMINE TRANSDERMAL SYSTEM 1 PATCH: 1 PATCH, EXTENDED RELEASE TRANSDERMAL at 10:57

## 2025-05-06 RX ADMIN — ONDANSETRON 4 MG: 2 INJECTION INTRAMUSCULAR; INTRAVENOUS at 13:48

## 2025-05-06 RX ADMIN — ACETAMINOPHEN 650 MG: 325 TABLET, FILM COATED ORAL at 17:35

## 2025-05-06 RX ADMIN — LIDOCAINE HYDROCHLORIDE 40 MG: 20 INJECTION, SOLUTION EPIDURAL; INFILTRATION; INTRACAUDAL; PERINEURAL at 12:47

## 2025-05-06 RX ADMIN — PROPOFOL 4 MG: 10 INJECTION, EMULSION INTRAVENOUS at 14:18

## 2025-05-06 RX ADMIN — SODIUM CHLORIDE, SODIUM LACTATE, POTASSIUM CHLORIDE, AND CALCIUM CHLORIDE 75 ML/HR: .6; .31; .03; .02 INJECTION, SOLUTION INTRAVENOUS at 17:27

## 2025-05-06 RX ADMIN — ACETAMINOPHEN 650 MG: 325 TABLET ORAL at 11:12

## 2025-05-06 RX ADMIN — KETOROLAC TROMETHAMINE 15 MG: 30 INJECTION, SOLUTION INTRAMUSCULAR at 21:34

## 2025-05-06 RX ADMIN — PROPOFOL 60 MG: 10 INJECTION, EMULSION INTRAVENOUS at 12:48

## 2025-05-06 RX ADMIN — SODIUM CHLORIDE, POTASSIUM CHLORIDE, SODIUM LACTATE AND CALCIUM CHLORIDE: 600; 310; 30; 20 INJECTION, SOLUTION INTRAVENOUS at 12:35

## 2025-05-06 RX ADMIN — TRANEXAMIC ACID 1000 MG: 10 INJECTION, SOLUTION INTRAVENOUS at 13:46

## 2025-05-06 RX ADMIN — POVIDONE-IODINE 1 APPLICATION: 5 SOLUTION TOPICAL at 10:52

## 2025-05-06 RX ADMIN — PROPOFOL 20 MG: 10 INJECTION, EMULSION INTRAVENOUS at 13:03

## 2025-05-06 RX ADMIN — CEFAZOLIN SODIUM 2 G: 2 INJECTION, SOLUTION INTRAVENOUS at 21:34

## 2025-05-06 RX ADMIN — CELECOXIB 100 MG: 100 CAPSULE ORAL at 11:12

## 2025-05-06 RX ADMIN — GABAPENTIN 300 MG: 300 CAPSULE ORAL at 11:12

## 2025-05-06 RX ADMIN — PROPOFOL 50 MCG/KG/MIN: 10 INJECTION, EMULSION INTRAVENOUS at 12:47

## 2025-05-06 RX ADMIN — CEFAZOLIN SODIUM 2 G: 2 INJECTION, SOLUTION INTRAVENOUS at 12:48

## 2025-05-06 RX ADMIN — MIDAZOLAM HYDROCHLORIDE 2 MG: 1 INJECTION, SOLUTION INTRAMUSCULAR; INTRAVENOUS at 12:00

## 2025-05-06 RX ADMIN — TRAMADOL HYDROCHLORIDE 50 MG: 50 TABLET, COATED ORAL at 11:12

## 2025-05-06 RX ADMIN — TRANEXAMIC ACID 1000 MG: 10 INJECTION, SOLUTION INTRAVENOUS at 12:57

## 2025-05-06 RX ADMIN — DOCUSATE SODIUM 100 MG: 100 CAPSULE, LIQUID FILLED ORAL at 21:34

## 2025-05-06 SDOH — SOCIAL STABILITY: SOCIAL INSECURITY
WITHIN THE LAST YEAR, HAVE YOU BEEN RAPED OR FORCED TO HAVE ANY KIND OF SEXUAL ACTIVITY BY YOUR PARTNER OR EX-PARTNER?: NO

## 2025-05-06 SDOH — ECONOMIC STABILITY: FOOD INSECURITY: HOW HARD IS IT FOR YOU TO PAY FOR THE VERY BASICS LIKE FOOD, HOUSING, MEDICAL CARE, AND HEATING?: NOT HARD AT ALL

## 2025-05-06 SDOH — ECONOMIC STABILITY: HOUSING INSECURITY: IN THE LAST 12 MONTHS, WAS THERE A TIME WHEN YOU WERE NOT ABLE TO PAY THE MORTGAGE OR RENT ON TIME?: NO

## 2025-05-06 SDOH — SOCIAL STABILITY: SOCIAL INSECURITY: WITHIN THE LAST YEAR, HAVE YOU BEEN HUMILIATED OR EMOTIONALLY ABUSED IN OTHER WAYS BY YOUR PARTNER OR EX-PARTNER?: NO

## 2025-05-06 SDOH — SOCIAL STABILITY: SOCIAL INSECURITY: DOES ANYONE TRY TO KEEP YOU FROM HAVING/CONTACTING OTHER FRIENDS OR DOING THINGS OUTSIDE YOUR HOME?: NO

## 2025-05-06 SDOH — SOCIAL STABILITY: SOCIAL INSECURITY
WITHIN THE LAST YEAR, HAVE YOU BEEN KICKED, HIT, SLAPPED, OR OTHERWISE PHYSICALLY HURT BY YOUR PARTNER OR EX-PARTNER?: NO

## 2025-05-06 SDOH — SOCIAL STABILITY: SOCIAL INSECURITY: HAVE YOU HAD THOUGHTS OF HARMING ANYONE ELSE?: NO

## 2025-05-06 SDOH — ECONOMIC STABILITY: HOUSING INSECURITY: DO YOU FEEL UNSAFE GOING BACK TO THE PLACE WHERE YOU LIVE?: NO

## 2025-05-06 SDOH — ECONOMIC STABILITY: FOOD INSECURITY: WITHIN THE PAST 12 MONTHS, THE FOOD YOU BOUGHT JUST DIDN'T LAST AND YOU DIDN'T HAVE MONEY TO GET MORE.: NEVER TRUE

## 2025-05-06 SDOH — SOCIAL STABILITY: SOCIAL INSECURITY: WITHIN THE LAST YEAR, HAVE YOU BEEN AFRAID OF YOUR PARTNER OR EX-PARTNER?: NO

## 2025-05-06 SDOH — ECONOMIC STABILITY: INCOME INSECURITY: IN THE PAST 12 MONTHS HAS THE ELECTRIC, GAS, OIL, OR WATER COMPANY THREATENED TO SHUT OFF SERVICES IN YOUR HOME?: NO

## 2025-05-06 SDOH — ECONOMIC STABILITY: FOOD INSECURITY: WITHIN THE PAST 12 MONTHS, YOU WORRIED THAT YOUR FOOD WOULD RUN OUT BEFORE YOU GOT THE MONEY TO BUY MORE.: NEVER TRUE

## 2025-05-06 SDOH — SOCIAL STABILITY: SOCIAL INSECURITY: HAVE YOU HAD ANY THOUGHTS OF HARMING ANYONE ELSE?: NO

## 2025-05-06 SDOH — SOCIAL STABILITY: SOCIAL INSECURITY: ARE THERE ANY APPARENT SIGNS OF INJURIES/BEHAVIORS THAT COULD BE RELATED TO ABUSE/NEGLECT?: NO

## 2025-05-06 SDOH — SOCIAL STABILITY: SOCIAL INSECURITY: WERE YOU ABLE TO COMPLETE ALL THE BEHAVIORAL HEALTH SCREENINGS?: YES

## 2025-05-06 SDOH — HEALTH STABILITY: MENTAL HEALTH: CURRENT SMOKER: 0

## 2025-05-06 SDOH — SOCIAL STABILITY: SOCIAL INSECURITY: DO YOU FEEL UNSAFE GOING BACK TO THE PLACE WHERE YOU ARE LIVING?: NO

## 2025-05-06 SDOH — SOCIAL STABILITY: SOCIAL INSECURITY: ARE YOU OR HAVE YOU BEEN THREATENED OR ABUSED PHYSICALLY, EMOTIONALLY, OR SEXUALLY BY ANYONE?: NO

## 2025-05-06 SDOH — SOCIAL STABILITY: SOCIAL INSECURITY: HAS ANYONE EVER THREATENED TO HURT YOUR FAMILY OR YOUR PETS?: NO

## 2025-05-06 SDOH — SOCIAL STABILITY: SOCIAL INSECURITY: DO YOU FEEL ANYONE HAS EXPLOITED OR TAKEN ADVANTAGE OF YOU FINANCIALLY OR OF YOUR PERSONAL PROPERTY?: NO

## 2025-05-06 SDOH — SOCIAL STABILITY: SOCIAL INSECURITY: ABUSE: ADULT

## 2025-05-06 ASSESSMENT — LIFESTYLE VARIABLES
HOW MANY STANDARD DRINKS CONTAINING ALCOHOL DO YOU HAVE ON A TYPICAL DAY: PATIENT DOES NOT DRINK
AUDIT-C TOTAL SCORE: 0
HOW OFTEN DO YOU HAVE 6 OR MORE DRINKS ON ONE OCCASION: NEVER
HOW OFTEN DO YOU HAVE A DRINK CONTAINING ALCOHOL: NEVER
SUBSTANCE_ABUSE_PAST_12_MONTHS: NO
SKIP TO QUESTIONS 9-10: 1
AUDIT-C TOTAL SCORE: 0
PRESCIPTION_ABUSE_PAST_12_MONTHS: NO

## 2025-05-06 ASSESSMENT — COLUMBIA-SUICIDE SEVERITY RATING SCALE - C-SSRS
2. HAVE YOU ACTUALLY HAD ANY THOUGHTS OF KILLING YOURSELF?: NO
1. IN THE PAST MONTH, HAVE YOU WISHED YOU WERE DEAD OR WISHED YOU COULD GO TO SLEEP AND NOT WAKE UP?: NO
6. HAVE YOU EVER DONE ANYTHING, STARTED TO DO ANYTHING, OR PREPARED TO DO ANYTHING TO END YOUR LIFE?: NO

## 2025-05-06 ASSESSMENT — PATIENT HEALTH QUESTIONNAIRE - PHQ9
SUM OF ALL RESPONSES TO PHQ9 QUESTIONS 1 & 2: 0
2. FEELING DOWN, DEPRESSED OR HOPELESS: NOT AT ALL
1. LITTLE INTEREST OR PLEASURE IN DOING THINGS: NOT AT ALL

## 2025-05-06 ASSESSMENT — ENCOUNTER SYMPTOMS
JOINT SWELLING: 1
ARTHRALGIAS: 1
MYALGIAS: 1

## 2025-05-06 ASSESSMENT — COGNITIVE AND FUNCTIONAL STATUS - GENERAL
TOILETING: A LITTLE
MOVING TO AND FROM BED TO CHAIR: A LITTLE
TURNING FROM BACK TO SIDE WHILE IN FLAT BAD: A LITTLE
MOBILITY SCORE: 18
WALKING IN HOSPITAL ROOM: A LITTLE
DAILY ACTIVITIY SCORE: 20
DRESSING REGULAR UPPER BODY CLOTHING: A LITTLE
MOVING FROM LYING ON BACK TO SITTING ON SIDE OF FLAT BED WITH BEDRAILS: A LITTLE
HELP NEEDED FOR BATHING: A LITTLE
DRESSING REGULAR UPPER BODY CLOTHING: A LITTLE
DAILY ACTIVITIY SCORE: 20
DRESSING REGULAR LOWER BODY CLOTHING: A LITTLE
DRESSING REGULAR LOWER BODY CLOTHING: A LITTLE
HELP NEEDED FOR BATHING: A LITTLE
TOILETING: A LITTLE
STANDING UP FROM CHAIR USING ARMS: A LITTLE
PATIENT BASELINE BEDBOUND: NO
MOBILITY SCORE: 18
TURNING FROM BACK TO SIDE WHILE IN FLAT BAD: A LITTLE
CLIMB 3 TO 5 STEPS WITH RAILING: A LITTLE
WALKING IN HOSPITAL ROOM: A LITTLE
MOVING FROM LYING ON BACK TO SITTING ON SIDE OF FLAT BED WITH BEDRAILS: A LITTLE
CLIMB 3 TO 5 STEPS WITH RAILING: A LITTLE
MOVING TO AND FROM BED TO CHAIR: A LITTLE
STANDING UP FROM CHAIR USING ARMS: A LITTLE

## 2025-05-06 ASSESSMENT — PAIN SCALES - GENERAL
PAINLEVEL_OUTOF10: 0 - NO PAIN

## 2025-05-06 ASSESSMENT — ACTIVITIES OF DAILY LIVING (ADL)
HEARING - RIGHT EAR: FUNCTIONAL
GROOMING: INDEPENDENT
HEARING - LEFT EAR: FUNCTIONAL
JUDGMENT_ADEQUATE_SAFELY_COMPLETE_DAILY_ACTIVITIES: YES
LACK_OF_TRANSPORTATION: NO
FEEDING YOURSELF: INDEPENDENT
BATHING: INDEPENDENT
TOILETING: NEEDS ASSISTANCE
WALKS IN HOME: INDEPENDENT
DRESSING YOURSELF: INDEPENDENT
PATIENT'S MEMORY ADEQUATE TO SAFELY COMPLETE DAILY ACTIVITIES?: YES
ADEQUATE_TO_COMPLETE_ADL: YES

## 2025-05-06 ASSESSMENT — PAIN - FUNCTIONAL ASSESSMENT
PAIN_FUNCTIONAL_ASSESSMENT: 0-10
PAIN_FUNCTIONAL_ASSESSMENT: 0-10

## 2025-05-06 NOTE — ANESTHESIA PROCEDURE NOTES
"Peripheral Block    Patient location during procedure: pre-op  Medication administered at: 5/6/2025 12:00 PM  End time: 5/6/2025 12:10 PM  Reason for block: at surgeon's request and post-op pain management  Staffing  Performed: attending   Authorized by: Ash Alex MD    Performed by: Ash Alex MD  Preanesthetic Checklist  Completed: patient identified, IV checked, site marked, risks and benefits discussed, surgical consent, monitors and equipment checked, pre-op evaluation and timeout performed   Timeout performed at: 5/6/2025 12:00 PM  Peripheral Block  Patient position: laying flat  Prep: ChloraPrep  Patient monitoring: heart rate, continuous pulse ox and cardiac monitor  Block type: adductor canal  Laterality: left  Injection technique: single-shot  Guidance: ultrasound guided  Needle  Needle gauge: 21 G  Needle length: 8 cm  Needle localization: ultrasound guidance  Needle insertion depth: 5 cm  Assessment  Injection assessment: negative aspiration for heme, no paresthesia on injection, incremental injection and local visualized surrounding nerve on ultrasound  Paresthesia pain: none  Heart rate change: no  Slow fractionated injection: yes  Additional Notes  Ropivacaine 0.5% 25 ml\"s with Epinephrine 1:200,000 and Methylprednisolone 40 mg given incrementally in 3 ml's volume with negative aspirations. Patient awake throughout. The patient tolerated the procedure well.   Medications Administered  midazolam (VERSED) IV - intravenous   2 mg - 5/6/2025 12:00:00 PM          "

## 2025-05-06 NOTE — CARE PLAN
Problem: Pain - Adult  Goal: Verbalizes/displays adequate comfort level or baseline comfort level  Outcome: Progressing     Problem: Safety - Adult  Goal: Free from fall injury  Outcome: Progressing     Problem: Discharge Planning  Goal: Discharge to home or other facility with appropriate resources  Outcome: Progressing     Problem: Chronic Conditions and Co-morbidities  Goal: Patient's chronic conditions and co-morbidity symptoms are monitored and maintained or improved  Outcome: Progressing     Problem: Nutrition  Goal: Nutrient intake appropriate for maintaining nutritional needs  Outcome: Progressing     Problem: Fall/Injury  Goal: Not fall by end of shift  Outcome: Progressing  Goal: Be free from injury by end of the shift  Outcome: Progressing  Goal: Verbalize understanding of personal risk factors for fall in the hospital  Outcome: Progressing  Goal: Verbalize understanding of risk factor reduction measures to prevent injury from fall in the home  Outcome: Progressing  Goal: Use assistive devices by end of the shift  Outcome: Progressing  Goal: Pace activities to prevent fatigue by end of the shift  Outcome: Progressing     Problem: Pain  Goal: Takes deep breaths with improved pain control throughout the shift  Outcome: Progressing  Goal: Turns in bed with improved pain control throughout the shift  Outcome: Progressing  Goal: Walks with improved pain control throughout the shift  Outcome: Progressing  Goal: Performs ADL's with improved pain control throughout shift  Outcome: Progressing  Goal: Participates in PT with improved pain control throughout the shift  Outcome: Progressing  Goal: Free from opioid side effects throughout the shift  Outcome: Progressing  Goal: Free from acute confusion related to pain meds throughout the shift  Outcome: Progressing   The patient's goals for the shift include      The clinical goals for the shift include

## 2025-05-06 NOTE — H&P
History Of Present Illness  Melita Peck is a 74 y.o. female presenting with left total knee replacement.  Patient postop and without complaints..    Past Medical History  She has a past medical history of Arthritis, HLD (hyperlipidemia), Hypertension, Joint pain, PONV (postoperative nausea and vomiting), and Vision loss.    Surgical History  She has a past surgical history that includes Tonsillectomy; Colonoscopy; Hysterectomy; and Bladder suspension.     Social History  She reports that she quit smoking about 47 years ago. Her smoking use included cigarettes. She has never used smokeless tobacco. She reports current alcohol use. She reports that she does not use drugs.    Family History  Family History[1]     Allergies  Patient has no known allergies.    Medications  Current Medications[2]    Review of Systems   Musculoskeletal:  Positive for arthralgias, joint swelling and myalgias.   All other systems reviewed and are negative.       Physical Exam  Vitals and nursing note reviewed.   Constitutional:       Appearance: Normal appearance.   HENT:      Head: Normocephalic and atraumatic.      Right Ear: Tympanic membrane, ear canal and external ear normal.      Left Ear: Tympanic membrane, ear canal and external ear normal.      Nose: Nose normal.      Mouth/Throat:      Mouth: Mucous membranes are moist.      Pharynx: Oropharynx is clear.   Eyes:      Extraocular Movements: Extraocular movements intact.      Conjunctiva/sclera: Conjunctivae normal.      Pupils: Pupils are equal, round, and reactive to light.   Cardiovascular:      Rate and Rhythm: Normal rate and regular rhythm.      Pulses: Normal pulses.      Heart sounds: Normal heart sounds.   Pulmonary:      Effort: Pulmonary effort is normal.      Breath sounds: Normal breath sounds.   Abdominal:      General: Abdomen is flat. Bowel sounds are normal.      Palpations: Abdomen is soft.   Genitourinary:     General: Normal vulva.      Rectum: Normal.    Musculoskeletal:         General: Normal range of motion.   Skin:     General: Skin is warm and dry.      Capillary Refill: Capillary refill takes less than 2 seconds.   Neurological:      General: No focal deficit present.      Mental Status: She is alert and oriented to person, place, and time. Mental status is at baseline.   Psychiatric:         Mood and Affect: Mood normal.         Behavior: Behavior normal.         Thought Content: Thought content normal.         Judgment: Judgment normal.          Last Recorded Vitals  /54   Pulse 56   Temp 36.7 °C (98.1 °F) (Temporal)   Resp 16   Wt 106 kg (234 lb 2.1 oz)   SpO2 99%     Relevant Results    No results found for this or any previous visit (from the past 24 hours).  Assessment/Plan   Problem List[3]   Patient fully evaluated on May 6.  Monitor blood pressure overnight.  Recheck labs in AM.  Mobilize patient tonight.  Physical and Occupational Therapy consultations obtained.    Robert Ruiz MD       [1]   Family History  Problem Relation Name Age of Onset    Other (CABG) Father     [2]   Current Facility-Administered Medications:     acetaminophen (Tylenol) tablet 650 mg, 650 mg, oral, q4h PRN, Ash Alex MD    albuterol 2.5 mg /3 mL (0.083 %) nebulizer solution 2.5 mg, 2.5 mg, nebulization, Once PRN, Ash Alex MD    dexAMETHasone (PF) (Decadron) injection 6 mg, 6 mg, intravenous, Once, Ash Alex MD    HYDROmorphone (Dilaudid) injection 0.2 mg, 0.2 mg, intravenous, q5 min PRN, Ash Alex MD    HYDROmorphone (Dilaudid) injection 0.5 mg, 0.5 mg, intravenous, q5 min PRN, Ash Alex MD    meperidine PF (Demerol) injection 12.5 mg, 12.5 mg, intravenous, q10 min PRN, Ash Alex MD    ondansetron (Zofran) injection 4 mg, 4 mg, intravenous, Once PRN, Ash Alex MD    scopolamine (Transderm-Scop) patch 1 patch, 1 patch, transdermal, q72h, ZULY Cortes-CNS, 1 patch at 05/06/25 1057  [3]   Patient  Active Problem List  Diagnosis    PONV (postoperative nausea and vomiting)    Arthritis of left knee

## 2025-05-06 NOTE — ANESTHESIA PREPROCEDURE EVALUATION
Patient: Melita Peck    Procedure Information       Date/Time: 05/06/25 1200    Procedure: LEFT TOTAL KNEE REPLACEMENT (Left: Knee) - LEFT TOTAL KNEE REPLACEMENT EXTENDED RECOVERY  NO STAPLES    Location: [facility] / [facility]    Surgeons: Channing Beckman MD            Relevant Problems   Anesthesia   (+) PONV (postoperative nausea and vomiting)       Clinical information reviewed:    Allergies                NPO Detail:  NPO/Void Status  Carbohydrate Drink Given Prior to Surgery? : N  Date of Last Liquid: 05/06/25  Time of Last Liquid: 0545  Date of Last Solid: 05/05/25  Time of Last Solid: 1900  Time of Last Void: 1034         Physical Exam    Airway  Mallampati: I  TM distance: >3 FB  Neck ROM: full  Mouth opening: 3 or more finger widths     Cardiovascular - normal exam   Dental - normal exam     Pulmonary - normal exam   Abdominal (+) obese             Anesthesia Plan    History of general anesthesia?: yes  History of complications of general anesthesia?: no    ASA 3     general, regional, spinal and MAC     The patient is not a current smoker.    intravenous induction   Anesthetic plan and risks discussed with patient.  Use of blood products discussed with patient who consented to blood products.    Plan discussed with CRNA.

## 2025-05-06 NOTE — ANESTHESIA PROCEDURE NOTES
Spinal Block    Start time: 5/6/2025 12:38 PM  End time: 5/6/2025 12:46 PM  Reason for block: primary anesthetic  Staffing  Performed: attending   Authorized by: Ash Alex MD    Performed by: Ash Alex MD    Preanesthetic Checklist  Completed: patient identified, IV checked, site marked, risks and benefits discussed, surgical consent, monitors and equipment checked, pre-op evaluation, timeout performed and sterile techniques followed  Block Timeout  RN/Licensed healthcare professional reads aloud to the Anesthesia provider and entire team: Patient identity, procedure with side and site, patient position, and as applicable the availability of implants/special equipment/special requirements.  Patient on coagulant treatment: no  Timeout performed at: 5/6/2025 12:38 PM  Spinal Block  Patient position: sitting  Prep: Betadine  Sterility prep: cap, drape, gloves, hand hygiene and mask  Sedation level: light sedation  Patient monitoring: blood pressure, continuous pulse oximetry and heart rate  Approach: midline  Vertebral space: L3-4  Injection technique: single-shot  Needle  Needle type: Luciano   Needle gauge: 25 G  Needle length: 3.5 in  Free flowing CSF: yes    Assessment  Sensory level: T10 bilateral  Block outcome: patient comfortable  Procedure assessment: patient sedated but conversant throughout procedure and patient tolerated procedure well with no immediate complications  Additional Notes  Betadine x3, Sterile drape, Lidocaine 1% skin wheal , 20 gauge introducer 25 gauge Luciano, positive CSF clear, no heme, no paresthesia, Bupivacaine 12 mg hyperbaric 0.75%.

## 2025-05-06 NOTE — OP NOTE
PRE OP DIAGNOSIS - OA LEFT KNEE    POST OP DIAGNOSIS- OA LEFT KNEE    PROCEDURE - LEFT TKR    SURGEON- RANDA TITUS MD    ASSIST-    UZIEL WONG PA-C    ANESTHESIA- SPINAL    BLOOD LOSS 100 CC    FINDINGS- SEVERE TRICOMPARTMENT OA    TOURNIQUET- NONE    DRAIN- NONE    IMPLANTS  J&J PFC    FEMUR   4N PS    TIBIAL BASE PLATE  3 MODULAR PLUS   INSERT 8 MM    PATELLA  38    OPERATIVE PROCEDURE    PATIENT WAS TAKEN TO THE OPERATING ROOM. UNDER ADEQUATE SPINAL ANESTHESIA, THE PATIENT WAS PLACED SUPINE ON THE TABLE AND LEFT LEG WAS PREPPED AND DRAPED IN A STERILE MANNER. NO TOURNIQUET WAS USED. THE KNEE WAS FLEXED TO 90 DEGREES AND ANTERIOR MIDLINE INCISION WAS MADE THROUGH THE SKIN, SUBCUTANEOUS TISSUE AND A MEDIAL DISSECTION WAS MADE.  A MEDIAL PARAPATELLAR CAPSULOTOMY WAS MADE AND THE PATELLA WAS EVERTED LATERALLY.  THE INFRAPATELLAR FAT PAD WAS EXCISED AND A MEDIAL RELEASE WAS PERFORMED.  THE ACL, PCL AND MEDIAL AND LATERAL MENISCI WERE EXCISED.  A DRILL WAS USED TO MAKE A HOLE IN THE DISTAL FEMUR WITH THE JIG SET AT 5 DEGREES VALGUS.  THE DISTAL FEMORAL CUT WAS MADE AND SIZED TO THE ABOVE IMPLANT. THE ANTERIOR, POSTERIOR CHAMFER AND BOX CUTS WERE MADE.  I TRIALED THE ABOVE SIZED FEMUR. NEXT A DRILL WAS USE TO ENTER THE PROXIMAL TIBIA AND THE TIBIA WAS CUT USING A 3 DEGREE POSTERIOR SLOPE BLOCK AND SIZED TO THE ABOVE IMPLANT.  A TRIAL REDUCTION WAS MADE WITH THE ABOVE FEMUR, TIBIA AND INSERT. THE PATELLA WAS CUT FREE HAND AND SIZED TO THE ABOVE IMPLANT.  THE KNEE HAD GOOD RANGE OF MOTION, GOOD MEDIAL AND LATERAL STABILITY AND GOOD PATELLAR TRACKING.  THE TRIAL COMPONENTS WERE REMOVED AND THE KNEE WAS IRRIGATED WITH PULSE IRRIGATION AND AN ORTHOPAEDIC COCKTAIL WAS INJECTED INTO THE POSTERIOR CAPSULE.  USING THE APPROPRIATE CEMENT AND THE J&J PFC SYSTEM THE ABOVE IMPLANTS WERE CEMENTED AND THE KNEE WAS PLACED IN FULL EXTENSION UNTIL THE CEMENT HARDENED.  EXCESS CEMENT WAS REMOVED.  AGAIN THE KNEE HAD GOOD RANGE OF MOTION,  GOOD STABILITY AND GOOD PATELLA TRACKING. THE KNEE WAS THEN IRRIGATED.  THE DEEP TISSUES WERE CLOSED WITH  #1 VICRYL, THE SUBCUTANEOUS TISSUE WAS CLOSED WITH 2-0 VICRYL AND THE SKIN WAS CLOSED WITH STAPLES.  A DRY STERILE BANDAGE WAS APPLIED AND THE PATIENT RETURNED TO THE RECOVERY ROOM IN SATISFACTORY CONDITION.    THE EXCELLENT ASSISTANCE OF CERTIFIED PA WAS NECESSARY FOR SUCCESSFUL OUTCOME OF THIS SURGERY INCLUDING PREP, EXPOSURE, RETRACTING AND CLOSURE    DICTATED BY DR. RANDA TITUS

## 2025-05-07 ENCOUNTER — PHARMACY VISIT (OUTPATIENT)
Dept: PHARMACY | Facility: CLINIC | Age: 75
End: 2025-05-07
Payer: COMMERCIAL

## 2025-05-07 VITALS
BODY MASS INDEX: 39.97 KG/M2 | SYSTOLIC BLOOD PRESSURE: 109 MMHG | HEART RATE: 53 BPM | RESPIRATION RATE: 18 BRPM | TEMPERATURE: 97.3 F | WEIGHT: 234.13 LBS | HEIGHT: 64 IN | OXYGEN SATURATION: 98 % | DIASTOLIC BLOOD PRESSURE: 56 MMHG

## 2025-05-07 LAB
ANION GAP SERPL CALC-SCNC: 10 MMOL/L (ref 10–20)
BUN SERPL-MCNC: 24 MG/DL (ref 6–23)
CALCIUM SERPL-MCNC: 8.4 MG/DL (ref 8.6–10.3)
CHLORIDE SERPL-SCNC: 103 MMOL/L (ref 98–107)
CO2 SERPL-SCNC: 28 MMOL/L (ref 21–32)
CREAT SERPL-MCNC: 1.17 MG/DL (ref 0.5–1.05)
EGFRCR SERPLBLD CKD-EPI 2021: 48 ML/MIN/1.73M*2
ERYTHROCYTE [DISTWIDTH] IN BLOOD BY AUTOMATED COUNT: 13 % (ref 11.5–14.5)
GLUCOSE SERPL-MCNC: 152 MG/DL (ref 74–99)
HCT VFR BLD AUTO: 35.2 % (ref 36–46)
HGB BLD-MCNC: 11.5 G/DL (ref 12–16)
MCH RBC QN AUTO: 31.6 PG (ref 26–34)
MCHC RBC AUTO-ENTMCNC: 32.7 G/DL (ref 32–36)
MCV RBC AUTO: 97 FL (ref 80–100)
NRBC BLD-RTO: 0 /100 WBCS (ref 0–0)
PLATELET # BLD AUTO: 248 X10*3/UL (ref 150–450)
POTASSIUM SERPL-SCNC: 4.2 MMOL/L (ref 3.5–5.3)
RBC # BLD AUTO: 3.64 X10*6/UL (ref 4–5.2)
SODIUM SERPL-SCNC: 137 MMOL/L (ref 136–145)
WBC # BLD AUTO: 7.9 X10*3/UL (ref 4.4–11.3)

## 2025-05-07 PROCEDURE — 99238 HOSP IP/OBS DSCHRG MGMT 30/<: CPT | Performed by: CLINICAL NURSE SPECIALIST

## 2025-05-07 PROCEDURE — 2500000001 HC RX 250 WO HCPCS SELF ADMINISTERED DRUGS (ALT 637 FOR MEDICARE OP): Performed by: CLINICAL NURSE SPECIALIST

## 2025-05-07 PROCEDURE — 2500000004 HC RX 250 GENERAL PHARMACY W/ HCPCS (ALT 636 FOR OP/ED): Performed by: ORTHOPAEDIC SURGERY

## 2025-05-07 PROCEDURE — 2500000001 HC RX 250 WO HCPCS SELF ADMINISTERED DRUGS (ALT 637 FOR MEDICARE OP): Performed by: ORTHOPAEDIC SURGERY

## 2025-05-07 PROCEDURE — RXMED WILLOW AMBULATORY MEDICATION CHARGE

## 2025-05-07 PROCEDURE — 97535 SELF CARE MNGMENT TRAINING: CPT | Mod: CQ,GP

## 2025-05-07 PROCEDURE — 97110 THERAPEUTIC EXERCISES: CPT | Mod: CQ,GP

## 2025-05-07 PROCEDURE — 97161 PT EVAL LOW COMPLEX 20 MIN: CPT | Mod: GP

## 2025-05-07 PROCEDURE — 82374 ASSAY BLOOD CARBON DIOXIDE: CPT | Performed by: CLINICAL NURSE SPECIALIST

## 2025-05-07 PROCEDURE — 85027 COMPLETE CBC AUTOMATED: CPT | Performed by: ORTHOPAEDIC SURGERY

## 2025-05-07 PROCEDURE — 97116 GAIT TRAINING THERAPY: CPT | Mod: CQ,GP

## 2025-05-07 PROCEDURE — 36415 COLL VENOUS BLD VENIPUNCTURE: CPT | Performed by: CLINICAL NURSE SPECIALIST

## 2025-05-07 PROCEDURE — 2500000004 HC RX 250 GENERAL PHARMACY W/ HCPCS (ALT 636 FOR OP/ED): Performed by: CLINICAL NURSE SPECIALIST

## 2025-05-07 PROCEDURE — 97535 SELF CARE MNGMENT TRAINING: CPT | Mod: CO,GO

## 2025-05-07 PROCEDURE — 7100000011 HC EXTENDED STAY RECOVERY HOURLY - NURSING UNIT

## 2025-05-07 PROCEDURE — 97165 OT EVAL LOW COMPLEX 30 MIN: CPT | Mod: GO

## 2025-05-07 RX ORDER — ASPIRIN 325 MG
325 TABLET, DELAYED RELEASE (ENTERIC COATED) ORAL 2 TIMES DAILY
Qty: 28 TABLET | Refills: 0 | Status: SHIPPED | OUTPATIENT
Start: 2025-05-07 | End: 2025-05-21

## 2025-05-07 RX ORDER — POLYETHYLENE GLYCOL 3350 17 G/17G
17 POWDER, FOR SOLUTION ORAL DAILY PRN
Qty: 7 PACKET | Refills: 0 | Status: SHIPPED | OUTPATIENT
Start: 2025-05-07

## 2025-05-07 RX ORDER — DOCUSATE SODIUM 100 MG/1
100 CAPSULE, LIQUID FILLED ORAL 2 TIMES DAILY
Qty: 28 CAPSULE | Refills: 0 | Status: SHIPPED | OUTPATIENT
Start: 2025-05-07 | End: 2025-05-21

## 2025-05-07 RX ORDER — ACETAMINOPHEN 500 MG
1000 TABLET ORAL 3 TIMES DAILY
Qty: 84 TABLET | Refills: 0 | Status: SHIPPED | OUTPATIENT
Start: 2025-05-07 | End: 2025-05-21

## 2025-05-07 RX ORDER — OXYCODONE HYDROCHLORIDE 5 MG/1
5-10 TABLET ORAL EVERY 6 HOURS PRN
Qty: 56 TABLET | Refills: 0 | Status: SHIPPED | OUTPATIENT
Start: 2025-05-07 | End: 2025-05-14

## 2025-05-07 RX ADMIN — DOCUSATE SODIUM 100 MG: 100 CAPSULE, LIQUID FILLED ORAL at 08:57

## 2025-05-07 RX ADMIN — KETOROLAC TROMETHAMINE 15 MG: 30 INJECTION, SOLUTION INTRAMUSCULAR at 05:58

## 2025-05-07 RX ADMIN — APIXABAN 2.5 MG: 2.5 TABLET, FILM COATED ORAL at 08:57

## 2025-05-07 RX ADMIN — CEFAZOLIN SODIUM 2 G: 2 INJECTION, SOLUTION INTRAVENOUS at 05:57

## 2025-05-07 RX ADMIN — ACETAMINOPHEN 650 MG: 325 TABLET, FILM COATED ORAL at 05:58

## 2025-05-07 RX ADMIN — FAMOTIDINE 20 MG: 20 TABLET, FILM COATED ORAL at 08:57

## 2025-05-07 RX ADMIN — ATENOLOL 50 MG: 50 TABLET ORAL at 06:02

## 2025-05-07 RX ADMIN — POLYETHYLENE GLYCOL 3350 17 G: 17 POWDER, FOR SOLUTION ORAL at 08:58

## 2025-05-07 RX ADMIN — CHLORTHALIDONE 25 MG: 25 TABLET ORAL at 06:02

## 2025-05-07 ASSESSMENT — COGNITIVE AND FUNCTIONAL STATUS - GENERAL
TURNING FROM BACK TO SIDE WHILE IN FLAT BAD: A LITTLE
DRESSING REGULAR LOWER BODY CLOTHING: A LITTLE
CLIMB 3 TO 5 STEPS WITH RAILING: A LITTLE
HELP NEEDED FOR BATHING: A LITTLE
TURNING FROM BACK TO SIDE WHILE IN FLAT BAD: A LITTLE
MOVING FROM LYING ON BACK TO SITTING ON SIDE OF FLAT BED WITH BEDRAILS: A LITTLE
MOBILITY SCORE: 19
CLIMB 3 TO 5 STEPS WITH RAILING: A LITTLE
MOVING TO AND FROM BED TO CHAIR: A LITTLE
TOILETING: A LITTLE
CLIMB 3 TO 5 STEPS WITH RAILING: A LOT
HELP NEEDED FOR BATHING: A LITTLE
STANDING UP FROM CHAIR USING ARMS: A LITTLE
DAILY ACTIVITIY SCORE: 20
MOBILITY SCORE: 18
STANDING UP FROM CHAIR USING ARMS: A LITTLE
MOVING TO AND FROM BED TO CHAIR: A LITTLE
TOILETING: A LITTLE
HELP NEEDED FOR BATHING: A LITTLE
MOVING TO AND FROM BED TO CHAIR: A LITTLE
TURNING FROM BACK TO SIDE WHILE IN FLAT BAD: A LITTLE
DRESSING REGULAR LOWER BODY CLOTHING: A LITTLE
WALKING IN HOSPITAL ROOM: A LITTLE
WALKING IN HOSPITAL ROOM: A LITTLE
DRESSING REGULAR LOWER BODY CLOTHING: A LITTLE
TOILETING: A LITTLE
WALKING IN HOSPITAL ROOM: A LITTLE
MOBILITY SCORE: 18
DAILY ACTIVITIY SCORE: 21
STANDING UP FROM CHAIR USING ARMS: A LITTLE
DAILY ACTIVITIY SCORE: 21
DRESSING REGULAR UPPER BODY CLOTHING: A LITTLE

## 2025-05-07 ASSESSMENT — ACTIVITIES OF DAILY LIVING (ADL): HOME_MANAGEMENT_TIME_ENTRY: 36

## 2025-05-07 ASSESSMENT — PAIN SCALES - GENERAL
PAINLEVEL_OUTOF10: 0 - NO PAIN
PAINLEVEL_OUTOF10: 0 - NO PAIN
PAIN_LEVEL: 0

## 2025-05-07 ASSESSMENT — PAIN - FUNCTIONAL ASSESSMENT: PAIN_FUNCTIONAL_ASSESSMENT: 0-10

## 2025-05-07 NOTE — PROGRESS NOTES
Occupational Therapy    OT Treatment    Patient Name: Melita Peck  MRN: [id number]  Department:   Room: [facility]214-  Today's Date: 5/7/2025  Time Calculation  Start Time: 0925  Stop Time: 1001  Time Calculation (min): 36 min        Assessment:  End of Session Patient Position: Up in chair, Alarm off, not on at start of session     Plan:  Treatment Interventions: ADL retraining, Functional transfer training, Neuromuscular reeducation, Compensatory technique education  OT Frequency: 3 times per week  OT Discharge Recommendations: Low intensity level of continued care  OT Recommended Transfer Status: Stand by assist, Assist of 1  OT - OK to Discharge: Yes (from an O.T. standpoint)  Treatment Interventions: ADL retraining, Functional transfer training, Neuromuscular reeducation, Compensatory technique education    Subjective   Previous Visit Info:  OT Last Visit  OT Received On: 05/07/25  General:  General  Prior to Session Communication: Bedside nurse (OTR)  Patient Position Received: Up in chair, Alarm off, not on at start of session  General Comment: in agreement and motivated to complete therapy session  Precautions:  LE Weight Bearing Status: Weight Bearing as Tolerated  Medical Precautions: Fall precautions  Post-Surgical Precautions: Left total knee precautions      Vital Signs Comment: VSS     Pain:  Pain Assessment  Pain Assessment:  (5/10 in L knee, ice pack applied at end of therapy session)    Objective    Cognition:  Cognition  Overall Cognitive Status: Within Functional Limits       Activities of Daily Living: LE Dressing  LE Dressing: Yes  Pants Level of Assistance: Close supervision  Sock Level of Assistance: Close supervision  Shoe Level of Assistance: Moderate assistance  Compression Hose Level of Assistance: Minimum assistance. Pt educated on use of compression stockings, importance of compression stockings, donning/doffing, and wear schedule.  Adult Briefs Level of Assistance: Close supervision  LE  Dressing Where Assessed: Chair  LE Dressing Comments: pt stated one will have assistance from children when needed at home  Pt educated on TKR precautions as applied to self care tasks and transfers. Pt verbalized and demonstrated understanding throughout tx session.     Functional Standing Tolerance:  Time: 5:00 standing at FWW to complete grooming tasks  Bed Mobility/Transfers: Transfers  Transfer: Yes  Transfer 1  Technique 1: Sit to stand, Stand to sit  Transfer Device 1: Walker  Transfer Level of Assistance 1: Close supervision    Car Transfers  Car Transfers Comments: .Pt educated on transferring in/out of car adhering to precautions. Following demonstration from this therapist pt verbalized understanding.         Functional Mobility:  Functional Mobility  Functional Mobility Performed: Yes  Functional Mobility 1  Device 1: Rolling walker  Assistance 1: Close supervision  Comments 1: pt ambulated in/out of bathroom with min vc for increased safety      Outcome Measures:[facility] Daily Activity  Putting on and taking off regular lower body clothing: A little  Bathing (including washing, rinsing, drying): A little  Putting on and taking off regular upper body clothing: None  Toileting, which includes using toilet, bedpan or urinal: A little  Taking care of personal grooming such as brushing teeth: None  Eating Meals: None  Daily Activity - Total Score: 21        Education Documentation  Precautions, taught by RENEE Harrison at 5/7/2025 12:28 PM.  Learner: Patient  Readiness: Acceptance  Method: Explanation  Response: Verbalizes Understanding    ADL Training, taught by RENEE Harrison at 5/7/2025 12:28 PM.  Learner: Patient  Readiness: Acceptance  Method: Explanation  Response: Verbalizes Understanding    Education Comments  No comments found.             Goals:  Encounter Problems       Encounter Problems (Active)       OT Goals       Increase LE bathing, dressing & toileting to supervision with  adaptive equipment as needed.  (Progressing)       Start:  05/07/25    Expected End:  05/09/25            Increase functional transfers & mobility to/from bed, chair & commode and simulated car to supervision with DME for safety  (Progressing)       Start:  05/07/25    Expected End:  05/09/25            Demonstrate compliance to post op precautions and safety techs during ADLs  (Progressing)       Start:  05/07/25    Expected End:  05/09/25

## 2025-05-07 NOTE — ANESTHESIA POSTPROCEDURE EVALUATION
Patient: Melita Peck    Procedure Summary       Date: 05/06/25 Room / Location: [facility] OR 07 / Virtual [facility] OR    Anesthesia Start: 1235 Anesthesia Stop: 1440    Procedure: LEFT TOTAL KNEE REPLACEMENT (Left: Knee) Diagnosis:       Arthritis of left knee      (LEFT KNEE ARTHRITIS)    Surgeons: Channing Beckman MD Responsible Provider: Ash Alex MD    Anesthesia Type: general, regional, spinal, MAC ASA Status: 3            Anesthesia Type: general, regional, spinal, MAC    Vitals Value Taken Time   /54 05/06/25 16:16   Temp 36.7 °C (98.1 °F) 05/06/25 14:35   Pulse 56 05/06/25 16:36   Resp 16 05/06/25 16:15   SpO2 98 % 05/06/25 16:36   Vitals shown include unfiled device data.    Anesthesia Post Evaluation    Patient location during evaluation: PACU  Patient participation: complete - patient participated  Level of consciousness: awake and alert  Pain score: 0  Pain management: adequate  Airway patency: patent  Cardiovascular status: acceptable and hemodynamically stable  Respiratory status: acceptable, spontaneous ventilation and nasal cannula  Hydration status: acceptable  Postoperative Nausea and Vomiting: none        There were no known notable events for this encounter.

## 2025-05-07 NOTE — DISCHARGE INSTRUCTIONS
PAIN MANAGEMENT AT HOME:  To provide the best pain control over the next few days when pain will be at it's worst, we suggest you continue to take the following medications routinely and separately to provide the best pain management.  In addition, apply ice VERY FREQUENTLY to incision.   Also use some type of alternative intervention such as music therapy, relaxation techniques, or an type of diversion (such as watching television or talking to a friend) to help control pain.  Do NOT take more than 4000mg of acetaminophen (tylenol) in 24 hours.        CONSTIPATION MANAGEMENT AT HOME:  Constipation is common after Joint Replacement surgery for several reasons.  A common side effect of all pain medication is constipation.  A decrease in your activity as well as change in your normal diet & appetite all contribute to the constipation.  At home, it is important to take a daily stool softener such as Colace, Milk of magnesium or senokot while you are taking pain medications to help manage the constipation.  In addition, if you do NOT have a bowel movement within 72 hours of discharge, add a laxative such as miralax.  Miralax normally takes 2 to 3 days to work so you will not receive immediate results.  It is also important to drink plenty of fluids, especially water.  Stool softeners & laxatives need water to work properly.  We also suggest you increase your fiber intake either with foods high in fiber or with some type of supplement such as benefiber or metamucil.    Foods that are high in fiber include:     Fruits such as pears, strawberries, avocado, apples, raspberries, bananas, kiwis   Vegetables such as carrots, beets, broccoli, brussel sprouts, spinach   Lentils and kidney beans   Split peas and chickpeas   Oats, almonds, maryan seeds, and sweet potatoes      ACTIVITY AT HOME:  You will need to continue with your therapy after discharge either with in home therapy or at an outpatient facility.  Most patients  initially do in home therapy for about two weeks then transition to outpatient therapy.  You have freedom of choice in which in home therapy and outpatient facility you plan to use.  Most in home therapy sessions will begin within 24 to 48 hours after discharge.  After about two weeks of in home therapy, you will most likely to ready for outpatient therapy sessions.  Outpatient therapy is normally twice a week for weeks.  While you are at home it is important that you perform the exercises the therapist went over with you in the hospital 2 to 3 times a day.  After you complete your exercises, apply ice to your incision to help with swelling and pain.  You should also be getting up and walking around your house every hour with the use of your walker.  While at rest, perform, ankle pumps on each foot at least ten times.  This will help with the swelling as well as decrease your risk for blood clots.  ALWAYS USE YOUR WALKER WITH ANY TYPE OF ACTIVITY!!!!    WOUND CARE:  The bandage on your incision will stay in place for 7 days.  The bandage is waterproof so you may shower with the bandage in place.  No need to wrap or cover it.  Some drainage on your bandage is perfectly normal.  Home health care will assist you with bandage removal.  Once the bandage is removed, your incision can be left open to air if there is no drainage present.  If your incision is draining at the time of bandage removal, home health care will assist you with applying a new gauze bandage.  Gauze bandages are NOT waterproof.  After the removal of the waterproof bandage, do NOT get your incision wet.  Swelling in your operative extremity will peak about 72 hours after surgery and can last for weeks.  To help with the swelling make sure you are elevating your leg and apply ice frequently.  In addition, you will receive compression stockings upon discharge from the hospital.  Make sure you are wearing these stockings on both your legs at home.   Generally we instruct you to wear during the day and remove at bedtime for the next 4 weeks.      *Apply ice to incision at least 5 times daily    *Wear bilateral abad hose stockings to lower extremities for next 4 weeks    *Do NOT take any non steroidal anti-inflammatory medications such as motrin, aleve, ibuprofen, celebrex or meloxicam    *Take daily stool softener.  If you experience any diarrhea, stop medication    *If you need a refill on your pain medication, contact your surgeon's office Monday through Thursday with as least 24 hours notice    Approximately one year after your surgery, you will be asked to complete a patient reported outcome measure survey.  This is one of the surveys you completed prior to surgery.  In order to measure our program outcomes, it is important that we get feedback from our patients on their progress.  In addition, having patients complete these forms both prior as well as after surgery is a government regulation.  This survey will be sent to you on your [employer] account.  Please take a few minutes to complete.  If you have any questions about the survey and/or need assistance, please contact [facility].    If you have any questions or concerns please contact the [facility]  [facility] at:  [phone]  [email or web address]  Message on  [employer] via secure chat    Thank you for choosing [facility] to have your total joint replacement surgery.  It was our pleasure assisting you in your recovery.

## 2025-05-07 NOTE — CARE PLAN
Problem: Pain - Adult  Goal: Verbalizes/displays adequate comfort level or baseline comfort level  Outcome: Progressing     Problem: Safety - Adult  Goal: Free from fall injury  Outcome: Progressing     Problem: Discharge Planning  Goal: Discharge to home or other facility with appropriate resources  Outcome: Progressing     Problem: Chronic Conditions and Co-morbidities  Goal: Patient's chronic conditions and co-morbidity symptoms are monitored and maintained or improved  Outcome: Progressing     Problem: Nutrition  Goal: Nutrient intake appropriate for maintaining nutritional needs  Outcome: Progressing     Problem: Fall/Injury  Goal: Not fall by end of shift  Outcome: Progressing  Goal: Be free from injury by end of the shift  Outcome: Progressing  Goal: Verbalize understanding of personal risk factors for fall in the hospital  Outcome: Progressing  Goal: Verbalize understanding of risk factor reduction measures to prevent injury from fall in the home  Outcome: Progressing  Goal: Use assistive devices by end of the shift  Outcome: Progressing  Goal: Pace activities to prevent fatigue by end of the shift  Outcome: Progressing     Problem: Pain  Goal: Takes deep breaths with improved pain control throughout the shift  Outcome: Progressing  Goal: Turns in bed with improved pain control throughout the shift  Outcome: Progressing  Goal: Walks with improved pain control throughout the shift  Outcome: Progressing  Goal: Performs ADL's with improved pain control throughout shift  Outcome: Progressing  Goal: Participates in PT with improved pain control throughout the shift  Outcome: Progressing  Goal: Free from opioid side effects throughout the shift  Outcome: Progressing  Goal: Free from acute confusion related to pain meds throughout the shift  Outcome: Progressing   The patient's goals for the shift include Pain control    The clinical goals for the shift include pain management

## 2025-05-07 NOTE — PROGRESS NOTES
Physical Therapy    Physical Therapy Evaluation    Patient Name: Melita Peck  MRN: [id number]  Today's Date: 5/7/2025   Time Calculation  Start Time: 0909  Stop Time: 0923  Time Calculation (min): 14 min  214/214-A    Assessment/Plan   PT Assessment  PT Assessment Results: Decreased strength, Decreased range of motion, Decreased endurance, Impaired balance, Decreased mobility, Decreased safety awareness, Orthopedic restrictions, Pain  Rehab Prognosis: Excellent  Barriers to Discharge Home: No anticipated barriers  Evaluation/Treatment Tolerance: Patient limited by pain  End of Session Communication: Bedside nurse  Assessment Comment: Continued skilled PT intervention indicated to facilitate increased strength, balance & gait stability  End of Session Patient Position: Up in chair, Alarm off, not on at start of session (call light in reach, declines cold pack)  IP OR SWING BED PT PLAN  Inpatient or Swing Bed: Inpatient  PT Plan  Treatment/Interventions: Bed mobility, Transfer training, Gait training, Stair training, Range of motion, Therapeutic exercise, Therapeutic activity  PT Plan: Ongoing PT  PT Frequency: BID  PT Discharge Recommendations: Low intensity level of continued care (initial 24hr support for safe transition home postop)  PT Recommended Transfer Status: Assist x1  PT - OK to Discharge: Yes (to next level of care when cleared by medical team)    Subjective     Current Problem:  1. Arthritis of left knee  Surgical Pathology Exam    Surgical Pathology Exam    acetaminophen (Tylenol) 500 mg tablet    docusate sodium (Colace) 100 mg capsule    oxyCODONE (Roxicodone) 5 mg immediate release tablet    polyethylene glycol (Glycolax, Miralax) 17 gram packet    aspirin 325 mg EC tablet    DISCONTINUED: apixaban (Eliquis) 2.5 mg tablet        Problem List[1]  history of HTN, HLD, GERD and OA   General Visit Information:  General  Reason for Referral: PT eval & treat/impaired mobility DX: oa lt knee; Lt tkr  5/6/25  Referred By: Rk  Caregiver Feedback: Per conference w/ RN patient stable to participate in therapy  Co-Treatment: OT  Co-Treatment Reason: to maximize safety/mobility  Patient Position Received: Up in chair, Alarm off, not on at start of session  General Comment: Pleasant & cooperative, receptive to mobility& instructions    Home Living:  Home Living  Home Living Comments: lives alone but son or dtr will assist upon d/c home; 1+3steps w/ bilat rails to enter house; basement laundry w/ rail on stairs otherwise 1st fl set up; walk in shower w/ rails; toilet riser w/ rails    Prior Level of Function:  Prior Function Per Pt/Caregiver Report  Prior Function Comments: independent mobility w/ use of cane, denies recent h/o falls; also owns ww; independent adl/iadl/driving/ works part time    Precautions:  Precautions  Precautions Comment: fall, lt tkr wbat      Pain:  Pain Assessment  Pain Assessment:  (no pain at rest, lt knee pain 9/10 w/ wbing activity, premedicated by RN, positioned for comfort after session)    Cognition:  Cognition  Orientation Level: Oriented X4    General Assessments:       Sensation  Light Touch: No apparent deficits      Functional Assessments:        Transfers  Transfer:  (cga sit<>stand effortful standing w/ difficulty extending le's & trunk; cga stand>sit w/ cues for safe positioning & hand plcmt)  Ambulation/Gait Training  Ambulation/Gait Training Performed:  (sba w/ ww 70ftx1, 20ftx1 cues for upright posture & safe position to ww w/ changes in direciton; lle antalgia w/ wbat gait)        Extremity/Trunk Assessments:        RLE   RLE : Within Functional Limits  LLE   LLE :  (seated knee extension minus ~10deg w/ le edema, flexion to ~70deg w/ painful end feel)    Outcome Measures:     [facility] Basic Mobility  Turning from your back to your side while in a flat bed without using bedrails: None  Moving from lying on your back to sitting on the side of a flat bed without using bedrails:  A little  Moving to and from bed to chair (including a wheelchair): A little  Standing up from a chair using your arms (e.g. wheelchair or bedside chair): A little  To walk in hospital room: A little  Climbing 3-5 steps with railing: A lot  Basic Mobility - Total Score: 18     Goals:  Encounter Problems       Encounter Problems (Active)       PT Problem       STG - Pt will transition supine <> sitting independently (Progressing)       Start:  05/07/25    Expected End:  05/08/25            STG - Pt will transfer STS with supervision  (Progressing)       Start:  05/07/25    Expected End:  05/08/25            STG - Pt will amb >=150 using ww with supervision  (Progressing)       Start:  05/07/25    Expected End:  05/08/25            STG -  Pt will navigate 4 stairs using rails with cga  (Progressing)       Start:  05/07/25    Expected End:  05/08/25            STG - Pt will perform 2-3 sets of postop tkr therex x10 to maximize functional strength and independence  (Progressing)       Start:  05/07/25    Expected End:  05/08/25               Pain - Adult            Education Documentation  Mobility Training, taught by Xochitl Boyle PT at 5/7/2025  9:53 AM.  Learner: Patient  Readiness: Acceptance  Method: Explanation  Response: Verbalizes Understanding  Comment: safety, activity progression, use of ww         [1]   Patient Active Problem List  Diagnosis    PONV (postoperative nausea and vomiting)    Arthritis of left knee

## 2025-05-07 NOTE — PROGRESS NOTES
Physical Therapy    Physical Therapy Treatment    Patient Name: Melita Peck  MRN: [id number]  Department:   Room: [address] [place] [address]  Today's Date: 5/7/2025  Time Calculation  Start Time: 1016  Stop Time: 1101  Time Calculation (min): 45 min         Assessment/Plan         PT Plan  Treatment/Interventions: Bed mobility, Transfer training, Gait training, Stair training, Range of motion, Therapeutic exercise, Therapeutic activity  PT Plan: Ongoing PT  PT Frequency: BID  PT Discharge Recommendations: Low intensity level of continued care (initial 24hr support for safe transition home postop)  PT Recommended Transfer Status: Assist x1  PT - OK to Discharge: Yes (to next level of care when cleared by medical team)      General Visit Information:   PT  Visit  PT Received On: 05/07/25       Subjective                  Objective   Pain: left knee 5/10                        Treatments:  Therapeutic Exercise  Therapeutic Exercise Performed:  (supine lle ap's, qs,hs,saqs,slrs,laqs,sitting heelslides x 20 reps   left knee aorm 0-85 degrees  cold pack to left knee post rx)    Bed Mobility  Bed Mobility:  (supine to sit and sit to supine sba)    Ambulation/Gait Training  Ambulation/Gait Training Performed:  (ambulated 100 feet x 2 using fixed wheeled walker sba)  Transfers  Transfer:  (sit to stand sba)    Stairs  Stairs:  (ascended/descended 6 inch platform step with walker cga   ascended/descended 4 steps using 2 rails  cga)    Outcome Measures:  [facility] Basic Mobility  Turning from your back to your side while in a flat bed without using bedrails: None  Moving from lying on your back to sitting on the side of a flat bed without using bedrails: A little  Moving to and from bed to chair (including a wheelchair): A little  Standing up from a chair using your arms (e.g. wheelchair or bedside chair): A little  To walk in hospital room: A little  Climbing 3-5 steps with railing: A little  Basic Mobility - Total Score: 19    Education  Documentation  Precautions, taught by Livia Ayala PTA at 5/7/2025 12:46 PM.  Learner: Patient  Readiness: Acceptance  Method: Demonstration  Response: Demonstrated Understanding    ADL Training, taught by Livia Ayala PTA at 5/7/2025 12:46 PM.  Learner: Patient  Readiness: Acceptance  Method: Demonstration  Response: Demonstrated Understanding    Education Comments  No comments found.               Encounter Problems       Encounter Problems (Active)       PT Problem       STG - Pt will transition supine <> sitting independently (Progressing)       Start:  05/07/25    Expected End:  05/08/25            STG - Pt will transfer STS with supervision  (Progressing)       Start:  05/07/25    Expected End:  05/08/25            STG - Pt will amb >=150 using ww with supervision  (Progressing)       Start:  05/07/25    Expected End:  05/08/25            STG -  Pt will navigate 4 stairs using rails with cga  (Progressing)       Start:  05/07/25    Expected End:  05/08/25            STG - Pt will perform 2-3 sets of postop tkr therex x10 to maximize functional strength and independence  (Progressing)       Start:  05/07/25    Expected End:  05/08/25               Pain - Adult

## 2025-05-07 NOTE — PROGRESS NOTES
Patient attended pre op educational TJR class. RAPT score 7.  Patient completed all preoperative PROMs surveys.    Discussed discharge plan with patient.  For discharge home today with in home therapy follow up.  Patient plans to utilize [facility] therapy as arranged by surgeon's office.  Instructed patient to call contact number upon discharge to arrange appointment for tomorrow.    Reviewed discharge instructions with patient & pt's son.  Patient verbalizes understanding of activity, wound care, pain management, home going medications and follow up care.  TJR zone form and new medication administration form provided to patient.

## 2025-05-07 NOTE — PROGRESS NOTES
Occupational Therapy    Evaluation    Patient Name: Melita Peck  MRN: [id number]  Today's Date: 5/7/2025  Time Calculation  Start Time: 0909  Stop Time: 0923  Time Calculation (min): 14 min  214/214-A    Assessment  IP OT Assessment  OT Assessment: Patient presents with impaired dynamic stand balance and impaired LE ROM s/p surgery; patient would benefit from O.T. services at a low intensity to improve independence with ADLs, transfers & mobility.  Prognosis: Excellent  Barriers to Discharge Home: No anticipated barriers (patient reports daughter & son will assist as needed)  End of Session Communication: Bedside nurse  End of Session Patient Position: Up in chair (call light in reach)    Plan:  Treatment Interventions: ADL retraining, Functional transfer training, Neuromuscular reeducation, Compensatory technique education  OT Frequency: 3 times per week  OT Discharge Recommendations: Low intensity level of continued care  OT Recommended Transfer Status: Stand by assist, Assist of 1  OT - OK to Discharge: Yes (from an O.T. standpoint)    Subjective     Current Problem:  1. Arthritis of left knee  Surgical Pathology Exam    Surgical Pathology Exam    acetaminophen (Tylenol) 500 mg tablet    docusate sodium (Colace) 100 mg capsule    oxyCODONE (Roxicodone) 5 mg immediate release tablet    polyethylene glycol (Glycolax, Miralax) 17 gram packet    aspirin 325 mg EC tablet    DISCONTINUED: apixaban (Eliquis) 2.5 mg tablet          General:  General  Reason for Referral: OT eval & treat (Arthritis left knee)  Referred By: Rk  Past Medical History Relevant to Rehab: 5/6/225: L TKR.  PMH: R TKR, HTN, HLD, GERD, OA  Family/Caregiver Present: No  Co-Treatment: PT  Co-Treatment Reason: To maximize patient safety & mobility  Prior to Session Communication: Bedside nurse  Patient Position Received: Up in chair  General Comment: Patient cleared for therapy by nursing.    Precautions:  LE Weight Bearing Status: Weight Bearing  as Tolerated (LLE)  Medical Precautions: Fall precautions  Precautions Comment: OOB with assist    Vital Signs:  Vital Signs Comment: VSS    Pain:  Pain Assessment  Pain Assessment:  (mild discomfort left knee while seated; up to 9/10 with mobility at times.)    Objective     Cognition:  Overall Cognitive Status: Within Functional Limits          Home Living:  Home Living Comments: Lives alone, daughter & son local/supportive; bed/bath 1st floor with 4 stairs to enter with rails; laundry in basement. Independent ADLs, transfers & mobility with cane; owns wheeled walker, reachers, raised toilet seat with rails, walk in shower with grab bars.  Independent IADLs, drives. Denies falls.       ADL:  Grooming Assistance: Independent  UE Dressing Assistance: Independent  LE Dressing Assistance: Minimal  Toileting Assistance with Device: Stand by    Activity Tolerance:  Endurance: Endurance does not limit participation in activity    Bed Mobility/Transfers:      Transfers  Transfer: Yes (SBA with wheeled walker)  Transfer 1  Trials/Comments 1: CGA sit to stand from chair with armrests; SBA to/from raised toilet with armrests.    Ambulation/Gait Training:  Functional Mobility  Functional Mobility Performed: Yes    Sitting Balance:  Dynamic Sitting Balance  Dynamic Sitting-Balance Support: Feet supported  Dynamic Sitting-Level of Assistance: Distant supervision    Standing Balance:  Dynamic Standing Balance  Dynamic Standing-Balance Support: Bilateral upper extremity supported  Dynamic Standing-Level of Assistance: Close supervision    Sensation:  Light Touch: No apparent deficits    Strength:  Strength Comments: BIBI WFL    Coordination:  Movements are Fluid and Coordinated: Yes       Outcome Measures: [facility] Daily Activity  Putting on and taking off regular lower body clothing: A little  Bathing (including washing, rinsing, drying): A little  Putting on and taking off regular upper body clothing: None  Toileting, which  includes using toilet, bedpan or urinal: A little  Taking care of personal grooming such as brushing teeth: None  Eating Meals: None  Daily Activity - Total Score: 21                       EDUCATION:     Education Documentation  Precautions, taught by Danna Duke OT at 5/7/2025  9:47 AM.  Learner: Patient  Readiness: Acceptance  Method: Explanation, Demonstration  Response: Verbalizes Understanding, Demonstrated Understanding    ADL Training, taught by Danna Duke OT at 5/7/2025  9:47 AM.  Learner: Patient  Readiness: Acceptance  Method: Explanation, Demonstration  Response: Verbalizes Understanding, Demonstrated Understanding    Education Comments  No comments found.        Goals:   Encounter Problems       Encounter Problems (Active)       OT Goals       Increase LE bathing, dressing & toileting to supervision with adaptive equipment as needed.  (Progressing)       Start:  05/07/25    Expected End:  05/09/25            Increase functional transfers & mobility to/from bed, chair & commode and simulated car to supervision with DME for safety  (Progressing)       Start:  05/07/25    Expected End:  05/09/25            Demonstrate compliance to post op precautions and safety techs during ADLs  (Progressing)       Start:  05/07/25    Expected End:  05/09/25

## 2025-05-13 NOTE — PROGRESS NOTES
Melita Peck is a 74 y.o. female on day 0 of admission presenting with Arthritis of left knee.      Subjective   No events overnight. Patient seen and examined at bedside. Labs discussed.       Objective     Last Recorded Vitals  /56 (BP Location: Right arm, Patient Position: Sitting)   Pulse 53   Temp 36.3 °C (97.3 °F) (Temporal)   Resp 18   Wt 106 kg (234 lb 2.1 oz)   SpO2 98%   Intake/Output last 3 Shifts:  No intake or output data in the 24 hours ending 05/13/25 1959    Admission Weight  Weight: 106 kg (234 lb 2.1 oz) (05/06/25 1037)    Daily Weight  05/06/25 : 106 kg (234 lb 2.1 oz)    Image Results  Electrocardiogram 12 Lead  Sinus bradycardia  Minimal voltage criteria for LVH, may be normal variant  Borderline ECG  No previous ECGs available  Confirmed by OLGA LIDIA MADRID MD ([id number]) on 2/28/2020 4:02:32 PM  Electrocardiogram 12 Lead  Sinus bradycardia  Low voltage, precordial leads  Left ventricular hypertrophy    Confirmed by MAYKEL MOHAN DO ([id number]) on 10/3/2020 9:25:29 AM      Physical Exam  Constitutional:       Appearance: Normal appearance.   HENT:      Head: Normocephalic and atraumatic.      Right Ear: Tympanic membrane, ear canal and external ear normal.      Left Ear: Tympanic membrane, ear canal and external ear normal.      Nose: Nose normal.      Mouth/Throat:      Mouth: Mucous membranes are moist.      Pharynx: Oropharynx is clear.   Eyes:      Extraocular Movements: Extraocular movements intact.      Conjunctiva/sclera: Conjunctivae normal.      Pupils: Pupils are equal, round, and reactive to light.   Cardiovascular:      Rate and Rhythm: Normal rate and regular rhythm.      Pulses: Normal pulses.      Heart sounds: Normal heart sounds.   Pulmonary:      Effort: Pulmonary effort is normal.      Breath sounds: Normal breath sounds.   Abdominal:      General: Abdomen is flat. Bowel sounds are normal.      Palpations: Abdomen is soft.   Genitourinary:     General: Normal vulva.       Rectum: Normal.   Musculoskeletal:         General: Normal range of motion.   Skin:     General: Skin is warm and dry.      Capillary Refill: Capillary refill takes less than 2 seconds.   Neurological:      General: No focal deficit present.      Mental Status: She is alert and oriented to person, place, and time. Mental status is at baseline.   Psychiatric:         Mood and Affect: Mood normal.         Behavior: Behavior normal.         Thought Content: Thought content normal.         Judgment: Judgment normal.      Relevant Results                              Assessment & Plan  Arthritis of left knee    PONV (postoperative nausea and vomiting)      OA L knee  Anemia  Elevated Cr  HTN  HLD    -S/p L TKA  -Home meds  -Repeat labs outpatient with PCP  -Home today           Dale Jones, DO

## 2025-05-19 LAB
LABORATORY COMMENT REPORT: NORMAL
PATH REPORT.FINAL DX SPEC: NORMAL
PATH REPORT.GROSS SPEC: NORMAL
PATH REPORT.RELEVANT HX SPEC: NORMAL
PATH REPORT.TOTAL CANCER: NORMAL

## (undated) DEVICE — TIP, SUCTION, SUPER SUCKER, KAM, MINI, CURVED

## (undated) DEVICE — SLEEVE, VASO PRESS, CALF GARMENT, MEDIUM, GREEN

## (undated) DEVICE — SEALER, BIPOLAR, AQUA MANTYS 6.0

## (undated) DEVICE — SYRINGE, 50 CC, LUER LOCK

## (undated) DEVICE — BLADE, SAW, RECIPROCATING, DOUBLE SIDED, THIN, STAINLESS STEEL

## (undated) DEVICE — DRESSING, MEPILEX BORDER, POST-OP AG, 4 X 12 IN

## (undated) DEVICE — DRAPE, U-DRAPE, NON STERILE

## (undated) DEVICE — MAT, AIR TRANSFER, 39X81

## (undated) DEVICE — BLADE, SAGITTAL DUAL CUT, 25 X 90 X 1.27

## (undated) DEVICE — SPONGE, LAP, XRAY DECT, 18IN X 18IN, W/LOOP, STERILE

## (undated) DEVICE — STAPLER, SKIN PROXIMATE, 35 WIDE

## (undated) DEVICE — BANDAGE, COFLEX, 6 X 5 YDS, FOAM TAN, STERILE, LF

## (undated) DEVICE — Device

## (undated) DEVICE — WOUND SYSTEM, DEBRIDEMENT & CLEANING, O.R DUOPAK

## (undated) DEVICE — CEMENT, BONE, CMW 1, 40 GM

## (undated) DEVICE — DRAPE, SHEET, THREE QUARTER, FAN FOLD, 57 X 77 IN

## (undated) DEVICE — NEEDLE, SPINAL, QUINCKE, 18 G X 3.5 IN, PINK HUB